# Patient Record
Sex: FEMALE | Race: WHITE | NOT HISPANIC OR LATINO | Employment: FULL TIME | URBAN - METROPOLITAN AREA
[De-identification: names, ages, dates, MRNs, and addresses within clinical notes are randomized per-mention and may not be internally consistent; named-entity substitution may affect disease eponyms.]

---

## 2023-09-05 ENCOUNTER — TELEPHONE (OUTPATIENT)
Age: 48
End: 2023-09-05

## 2023-09-05 NOTE — TELEPHONE ENCOUNTER
Patient called to schedule appt w/ Dr Alexys Hernandez. She previously had breast cancer and a masectomy. She wants to have breast reconstruction, she states there were prior complications w/ the left breast and now wants to take care of it. Please contact patient regarding appt.   Thank you    Pt has [employer] ins, will provide ID number when scheduling appt

## 2023-09-26 ENCOUNTER — OFFICE VISIT (OUTPATIENT)
Dept: PLASTIC SURGERY | Facility: CLINIC | Age: 48
End: 2023-09-26
Payer: COMMERCIAL

## 2023-09-26 VITALS
WEIGHT: 175 LBS | HEART RATE: 82 BPM | HEIGHT: 64 IN | SYSTOLIC BLOOD PRESSURE: 107 MMHG | BODY MASS INDEX: 29.88 KG/M2 | TEMPERATURE: 97.9 F | DIASTOLIC BLOOD PRESSURE: 82 MMHG

## 2023-09-26 DIAGNOSIS — Z90.13 S/P MASTECTOMY, BILATERAL: ICD-10-CM

## 2023-09-26 DIAGNOSIS — N65.1 BREAST ASYMMETRY FOLLOWING RECONSTRUCTIVE SURGERY: ICD-10-CM

## 2023-09-26 DIAGNOSIS — F31.9 BIPOLAR 1 DISORDER (HCC): ICD-10-CM

## 2023-09-26 DIAGNOSIS — Z85.3 HISTORY OF RIGHT BREAST CANCER: Primary | ICD-10-CM

## 2023-09-26 DIAGNOSIS — Z15.01 BRCA1 POSITIVE: ICD-10-CM

## 2023-09-26 DIAGNOSIS — I42.7 CARDIOMYOPATHY SECONDARY TO DRUG (HCC): ICD-10-CM

## 2023-09-26 DIAGNOSIS — Z15.09 BRCA1 POSITIVE: ICD-10-CM

## 2023-09-26 PROCEDURE — 99205 OFFICE O/P NEW HI 60 MIN: CPT | Performed by: PLASTIC SURGERY

## 2023-09-26 RX ORDER — CLONAZEPAM 0.5 MG/1
TABLET ORAL
COMMUNITY

## 2023-09-26 RX ORDER — CARVEDILOL 6.25 MG/1
TABLET ORAL
COMMUNITY
Start: 2023-08-13

## 2023-09-26 RX ORDER — CLOMIPRAMINE HYDROCHLORIDE 25 MG/1
150 CAPSULE ORAL
COMMUNITY

## 2023-09-26 RX ORDER — LEVOTHYROXINE SODIUM 20 UG/ML
INJECTION, SOLUTION INTRAVENOUS
COMMUNITY

## 2023-09-26 RX ORDER — CARIPRAZINE 1.5 MG-3MG
3 KIT ORAL
COMMUNITY

## 2023-09-26 RX ORDER — LAMOTRIGINE 100 MG/1
TABLET, EXTENDED RELEASE ORAL
COMMUNITY

## 2023-09-26 NOTE — PROGRESS NOTES
Plastic Surgery H&P  Rodney Jimenes      Assessment/Plan:    Assessment:  1. Hx of right breast cancer  2. BRCA 1 gene mutation   3. S/P B/L mastectomies  4. S/P implant reconstruction on right   5. Hx of failed tissue expander on left  6. Breast asymmetry  7. Bipolar disorder  8. Cardiomyopathy- chemo induced. Plan:   I had a lengthy discussion with the patient regarding her reconstructive options. We discussed autologous versus implant based reconstruction. For the left breast, I discussed the options of replacement of a left breast tissue expander with ADM versus latissimus dorsi myocutaneous flap with tissue expander placement versus GENE free flap reconstruction. I then discussed the potential risks, benefits and alternatives, including operative and recovery time of each procedure. Risks discussed included, but were not limited to: bleeding, infection, scarring, poor wound healing, demonstrating underlying structures, need for further surgery, need for multiple procedures, mastectomy flap necrosis, partial mastectomy flap necrosis, the off-label use of acellular dermal matrix, the risk of breast implant associated anaplastic large cell lymphoma, the need for the expansion process, the risk of seroma, the risk of DVT, risk of asymmetry, the need for multiple procedures, the need for revision, poor aesthetic result, asymmetry. In regard to GENE flap surgery, I discussed the complex nature of micro- surgery, benefits and alternatives of abdominally based autologous reconstruction including the above with the additional risk of micro surgical failure, flap failure, need for ICU stay, need for multiple procedures. The patient and her mother noted their understanding. All the patient's questions were answered to her satisfaction. At this time, she is unsure how she desires to proceed.   She will think about her options and let us know.  She is always welcome to call or return with any additional questions or concerns. Keyshawn Vitale is a 50 y.o. female who presents for evaluation for possible breast reconstruction. Patient has history of right breast cancer, diagnosed in June of 2022. Pathologic stage from 6/24/2022: Stage IIA (pT2, pN0(sn), cM0, G3, ER+, [place]-, HER2-). Patient was also found to be BRCA1 mutation positive. She underwent bilateral mastectomies. She initially had tissue expander reconstruction at [facility]. The left side tissue expander became infected and required removal.  Patient did complete the reconstruction on the right side with placement of the permanent implant. She is unhappy with the asymmetry and desires to proceed with left breast reconstruction. She presents today to discuss her reconstructive options. Patient has underlying bipolar disorder, which is well controlled. She did develop a cardiomyopathy from her initial dose of chemotherapy. She is on Coreg and stable. She denies any chest pain, shortness of breath, weakness, dizziness. She states she is in her usual state of health. No significant weight fluctuations. Past Medical History:   Diagnosis Date   • Bipolar 1 disorder ([address])    • Breast cancer ([address])     right   • Cardiomyopathy ([address])    • Hypertension    • Hypothyroid          Current Outpatient Medications:   •  Cariprazine HCl (Vraylar) 1.5 & 3 MG CPPK, Take 3 mg by mouth, Disp: , Rfl:   •  carvedilol (COREG) 6.25 mg tablet, , Disp: , Rfl:   •  clomiPRAMINE (ANAFRANIL) 25 mg capsule, Take 150 mg by mouth, Disp: , Rfl:   •  clonazePAM (KlonoPIN) 0.5 mg tablet, , Disp: , Rfl:   •  lamoTRIgine ER (LaMICtal XR) 100 MG TB24, , Disp: , Rfl:   •  Levothyroxine Sodium 100 MCG/5ML SOLN, Use, Disp: , Rfl:       Allergies   Allergen Reactions   • Amoxicillin-Pot Clavulanate Diarrhea       Family History   Family history unknown: Yes   Adopted.       Social History     Socioeconomic History   • Marital status: Single     Spouse name: Not on file   • Number of children: Not on file   • Years of education: Not on file   • Highest education level: Not on file   Occupational History   • Not on file   Tobacco Use   • Smoking status: Never   • Smokeless tobacco: Never   Substance and Sexual Activity   • Alcohol use: Never   • Drug use: Never   • Sexual activity: Not on file   Other Topics Concern   • Not on file   Social History Narrative   • Not on file     Social Determinants of Health     Financial Resource Strain: Not on file   Food Insecurity: Not on file   Transportation Needs: Not on file   Physical Activity: Not on file   Stress: Not on file   Social Connections: Not on file   Intimate Partner Violence: Not on file   Housing Stability: Not on file       Review of Systems  Pertinent items are noted in HPI. Objective     /82 (BP Location: Left arm, Patient Position: Sitting, Cuff Size: Standard)   Pulse 82   Temp 97.9 °F (36.6 °C) (Tympanic)   Ht 5' 4" (1.626 m)   Wt 79.4 kg (175 lb)   BMI 30.04 kg/m²     General:  alert and oriented, in no acute distress   Skin:  normal and no rash or abnormalities   Eyes: conjunctivae/corneas clear. PERRL, EOM's intact. Fundi benign. Mouth: MMM no lesions   Lymph Nodes:  Cervical, supraclavicular, and axillary nodes normal.   Lungs:  clear to auscultation bilaterally   Heart:  regular rate and rhythm, S1, S2 normal, no murmur, click, rub or gallop   Abdomen: soft, non-tender; bowel sounds normal; no masses,  no organomegaly   CVA:  absent   Genitourinary: defer exam   Extremities:  extremities normal, warm and well-perfused; no cyanosis, clubbing, or edema   Neurologic:  Alert and oriented x3. Gait normal. Reflexes and motor strength normal and symmetric. Cranial nerves 2-12 and sensation grossly intact. Psychiatric:  normal mood, behavior, speech, dress, and thought processes      Right breast: Implant in place, soft, mobile. Proportional shape and contour. No masses palpable, no axillary adenopathy palpable  Left breast: Acquired absence of left breast, well-healed transverse mastectomy scar with skin deficiency noted centrally and skin excess present laterally. No masses palpable, no axillary adenopathy palpable. A total of 60 mins was spent on the encounter, including reviewing the patient's records, documentation, and time spent in counseling coordination of care which represent greater than 50% of the visit. 35 mins was spent in counseling and discussion of surgical procedures.

## 2023-10-16 ENCOUNTER — TELEPHONE (OUTPATIENT)
Dept: PLASTIC SURGERY | Facility: CLINIC | Age: 48
End: 2023-10-16

## 2023-10-16 ENCOUNTER — TELEPHONE (OUTPATIENT)
Age: 48
End: 2023-10-16

## 2023-10-16 NOTE — TELEPHONE ENCOUNTER
Left message that Rebekah Riggins is talking with Dr. Naa Palmer tomorrow on how to proceed with an appointment for surgery. Either Rebekah Riggins or I will call patient back after getting information from Dr. Naa Palmer.

## 2023-10-16 NOTE — TELEPHONE ENCOUNTER
Patient called to schedule breast reconstruction before the next year , I have transferred  her to [facility] and patient left a detailed  vm .

## 2023-10-17 ENCOUNTER — TELEPHONE (OUTPATIENT)
Age: 48
End: 2023-10-17

## 2023-10-17 NOTE — TELEPHONE ENCOUNTER
Patient called ready to schedule breast recon surgery. I advised I would contact Tiburcio Nicole and let her know she is ready. I advised patient that Tiburcio Nicole would get back to her.

## 2023-10-23 ENCOUNTER — TELEPHONE (OUTPATIENT)
Dept: PLASTIC SURGERY | Facility: CLINIC | Age: 48
End: 2023-10-23

## 2023-10-23 NOTE — TELEPHONE ENCOUNTER
Patient will like a call back has been waiting since last week for a call I did advise that sometime it will take that long to receive a call back so she understood

## 2023-10-23 NOTE — TELEPHONE ENCOUNTER
Left message for patient that I forwarded her request to Dr. Dre Hernandez to please call to discuss surgery.

## 2023-10-24 ENCOUNTER — TELEPHONE (OUTPATIENT)
Dept: PLASTIC SURGERY | Facility: CLINIC | Age: 48
End: 2023-10-24

## 2023-10-24 NOTE — TELEPHONE ENCOUNTER
Called and left message for the patient regarding surgery scheduling. Instructed to call back tomorrow when I am in the office.

## 2023-10-25 NOTE — TELEPHONE ENCOUNTER
Called and spoke with the patient. She states she is interested in proceeding with left breast reconstruction with tissue expander/ADM. We will work on scheduling.

## 2023-10-25 NOTE — TELEPHONE ENCOUNTER
Rec'd call from patient stating that she has been waiting to receive call regarding obtaining surgery date. I stated that Dr. Stephen Vyas called her last night? She states that she did not receive that call. Please return call to patient to [phone]. Thank youj.

## 2023-10-26 ENCOUNTER — PREP FOR PROCEDURE (OUTPATIENT)
Dept: PLASTIC SURGERY | Facility: CLINIC | Age: 48
End: 2023-10-26

## 2023-10-26 ENCOUNTER — TELEPHONE (OUTPATIENT)
Dept: PLASTIC SURGERY | Facility: CLINIC | Age: 48
End: 2023-10-26

## 2023-10-26 DIAGNOSIS — Z85.3 PERSONAL HISTORY OF BREAST CANCER: Primary | ICD-10-CM

## 2023-10-26 DIAGNOSIS — Z90.13 H/O MASTECTOMY, BILATERAL: ICD-10-CM

## 2023-11-13 ENCOUNTER — OFFICE VISIT (OUTPATIENT)
Dept: PLASTIC SURGERY | Facility: CLINIC | Age: 48
End: 2023-11-13
Payer: COMMERCIAL

## 2023-11-13 VITALS
SYSTOLIC BLOOD PRESSURE: 120 MMHG | WEIGHT: 175 LBS | BODY MASS INDEX: 29.88 KG/M2 | TEMPERATURE: 98.2 F | DIASTOLIC BLOOD PRESSURE: 93 MMHG | HEIGHT: 64 IN | HEART RATE: 76 BPM

## 2023-11-13 DIAGNOSIS — Z15.01 BRCA1 POSITIVE: ICD-10-CM

## 2023-11-13 DIAGNOSIS — Z90.13 S/P MASTECTOMY, BILATERAL: ICD-10-CM

## 2023-11-13 DIAGNOSIS — F31.9 BIPOLAR 1 DISORDER (HCC): ICD-10-CM

## 2023-11-13 DIAGNOSIS — N65.1 BREAST ASYMMETRY FOLLOWING RECONSTRUCTIVE SURGERY: ICD-10-CM

## 2023-11-13 DIAGNOSIS — Z85.3 HISTORY OF RIGHT BREAST CANCER: ICD-10-CM

## 2023-11-13 DIAGNOSIS — Z15.09 BRCA1 POSITIVE: ICD-10-CM

## 2023-11-13 DIAGNOSIS — Z85.3 HISTORY OF RIGHT BREAST CANCER: Primary | ICD-10-CM

## 2023-11-13 DIAGNOSIS — I42.7 CARDIOMYOPATHY SECONDARY TO DRUG (HCC): ICD-10-CM

## 2023-11-13 PROCEDURE — 99215 OFFICE O/P EST HI 40 MIN: CPT | Performed by: PLASTIC SURGERY

## 2023-11-13 RX ORDER — OXYCODONE HYDROCHLORIDE 5 MG/1
5 TABLET ORAL EVERY 4 HOURS PRN
Qty: 15 TABLET | Refills: 0 | Status: SHIPPED | OUTPATIENT
Start: 2023-11-13

## 2023-11-13 RX ORDER — CYCLOBENZAPRINE HCL 10 MG
10 TABLET ORAL 3 TIMES DAILY
Qty: 30 TABLET | Refills: 0 | Status: SHIPPED | OUTPATIENT
Start: 2023-11-13 | End: 2023-11-23

## 2023-11-13 RX ORDER — GABAPENTIN 300 MG/1
300 CAPSULE ORAL 3 TIMES DAILY PRN
Qty: 30 CAPSULE | Refills: 0 | Status: SHIPPED | OUTPATIENT
Start: 2023-11-13

## 2023-11-13 RX ORDER — SULFAMETHOXAZOLE AND TRIMETHOPRIM 800; 160 MG/1; MG/1
1 TABLET ORAL EVERY 8 HOURS SCHEDULED
Qty: 42 TABLET | Refills: 0 | Status: SHIPPED | OUTPATIENT
Start: 2023-11-13 | End: 2023-11-14

## 2023-11-13 RX ORDER — ONDANSETRON 4 MG/1
4 TABLET, FILM COATED ORAL EVERY 8 HOURS PRN
Qty: 20 TABLET | Refills: 0 | Status: SHIPPED | OUTPATIENT
Start: 2023-11-13

## 2023-11-13 RX ORDER — ACETAMINOPHEN 325 MG/1
975 TABLET ORAL EVERY 6 HOURS
Qty: 120 TABLET | Refills: 0 | Status: SHIPPED | OUTPATIENT
Start: 2023-11-13 | End: 2023-11-23

## 2023-11-14 RX ORDER — CEPHALEXIN 500 MG/1
500 CAPSULE ORAL EVERY 8 HOURS SCHEDULED
Qty: 42 CAPSULE | Refills: 0 | Status: SHIPPED | OUTPATIENT
Start: 2023-11-14 | End: 2023-11-28

## 2023-11-14 NOTE — PROGRESS NOTES
[facility] Surgery H&P  Rodney Jimenes        Assessment/Plan:     Assessment:  1. Hx of right breast cancer  2. BRCA 1 gene mutation   3. S/P B/L mastectomies  4. S/P implant reconstruction on right   5. Hx of failed tissue expander on left  6. Breast asymmetry  7. Bipolar disorder  8. Cardiomyopathy- chemo induced. Plan:  The patient has elected for delayed left breast reconstruction with tissue expander and ACell dermal matrix placement. I discussed the potential risks, benefits and alternatives, including operative and recovery time of the procedure. Risks discussed included, but were not limited to: bleeding, infection, scarring, hematoma, poor wound healing, damage to underlying structures, need for further surgery, need for multiple procedures, skin flap necrosis, the off-label use of acellular dermal matrix, the need for the expansion process, the risk of seroma, the risk of DVT, risk of asymmetry,  the need for revision, poor aesthetic result, need for removal of the tissue expander. The patient noted her understanding. All the patient's questions were answered to her satisfaction. At this time, she desires to proceed. Informed consent obtained. Pre and postop instructions discussed. Use of surgical drains and mohsen dressings discussed. She remains an appropriate candidate for surgery. Surgical risk factors include: Obesity, history of previous failed tissue expander. She is always welcome to call or return with any additional questions or concerns. Keyshawn Emery is a 50 y.o. female who presents for pre-op evaluation for  breast reconstruction. Patient has history of right breast cancer, diagnosed in June of 2022. Pathologic stage from 6/24/2022: Stage IIA (pT2, pN0(sn), cM0, G3, ER+, [place]-, HER2-). Patient was also found to be BRCA1 mutation positive. She underwent bilateral mastectomies.   She initially had tissue expander reconstruction at [facility]. The left side tissue expander became infected and required removal.  Patient did complete the reconstruction on the right side with placement of the permanent implant. She is unhappy with the asymmetry and desires to proceed with left breast reconstruction. She presents today to discuss her reconstructive options. Patient has underlying bipolar disorder, which is well controlled. She did develop a cardiomyopathy from her initial dose of chemotherapy. She is on Coreg and stable. She denies any chest pain, shortness of breath, weakness, dizziness. She states she is in her usual state of health. No significant weight fluctuations. Medical History        Past Medical History:   Diagnosis Date   • Bipolar 1 disorder ([address])     • Breast cancer ([address])       right   • Cardiomyopathy ([address])     • Hypertension     • Hypothyroid                 Current Outpatient Medications:   •  Cariprazine HCl (Vraylar) 1.5 & 3 MG CPPK, Take 3 mg by mouth, Disp: , Rfl:   •  carvedilol (COREG) 6.25 mg tablet, , Disp: , Rfl:   •  clomiPRAMINE (ANAFRANIL) 25 mg capsule, Take 150 mg by mouth, Disp: , Rfl:   •  clonazePAM (KlonoPIN) 0.5 mg tablet, , Disp: , Rfl:   •  lamoTRIgine ER (LaMICtal XR) 100 MG TB24, , Disp: , Rfl:   •  Levothyroxine Sodium 100 MCG/5ML SOLN, Use, Disp: , Rfl:              Allergies   Allergen Reactions   • Amoxicillin-Pot Clavulanate Diarrhea         Family History   Family History   Family history unknown: Yes      Adopted.         Social History               Socioeconomic History   • Marital status: Single       Spouse name: Not on file   • Number of children: Not on file   • Years of education: Not on file   • Highest education level: Not on file   Occupational History   • Not on file   Tobacco Use   • Smoking status: Never   • Smokeless tobacco: Never   Substance and Sexual Activity   • Alcohol use: Never   • Drug use: Never   • Sexual activity: Not on file   Other Topics Concern   • Not on file   Social History Narrative   • Not on file      Social Determinants of Health      Financial Resource Strain: Not on file   Food Insecurity: Not on file   Transportation Needs: Not on file   Physical Activity: Not on file   Stress: Not on file   Social Connections: Not on file   Intimate Partner Violence: Not on file   Housing Stability: Not on file            Review of Systems  Pertinent items are noted in HPI. Objective     /93   Pulse 76   Temp 98.2 °F (36.8 °C) (Tympanic)   Ht 5' 4" (1.626 m)   Wt 79.4 kg (175 lb)   BMI 30.04 kg/m²        General:  alert and oriented, in no acute distress   Skin:  normal and no rash or abnormalities   Eyes: conjunctivae/corneas clear. PERRL, EOM's intact. Fundi benign. Mouth: MMM no lesions   Lymph Nodes:  Cervical, supraclavicular, and axillary nodes normal.   Lungs:  clear to auscultation bilaterally   Heart:  regular rate and rhythm, S1, S2 normal, no murmur, click, rub or gallop   Abdomen: soft, non-tender; bowel sounds normal; no masses,  no organomegaly   CVA:  absent   Genitourinary: defer exam   Extremities:  extremities normal, warm and well-perfused; no cyanosis, clubbing, or edema   Neurologic:  Alert and oriented x3. Gait normal. Reflexes and motor strength normal and symmetric. Cranial nerves 2-12 and sensation grossly intact. Psychiatric:  normal mood, behavior, speech, dress, and thought processes   Right breast: Implant in place, soft, mobile. Proportional shape and contour. No masses palpable, no axillary adenopathy palpable  Left breast: Acquired absence of left breast, well-healed transverse mastectomy scar with skin deficiency noted centrally and skin excess present laterally. No masses palpable, no axillary adenopathy palpable.         A total of 45 mins was spent on the encounter, including reviewing the patient's records, documentation, and time spent in counseling coordination of care which represent greater than 50% of the visit. 30 mins was spent in counseling and discussion of surgical procedures.

## 2023-11-16 ENCOUNTER — TELEPHONE (OUTPATIENT)
Age: 48
End: 2023-11-16

## 2023-11-16 NOTE — TELEPHONE ENCOUNTER
Patient called to see if we received a fax from [facility] for her FMLA/Disability. Checked with the office, Cora Barr advised that they did not see it in the office but Starr Dixon might have it but is not in the office at this time. I advised I would send her a message and would have her give the patient a call to let her know if she had it or not.

## 2023-11-21 ENCOUNTER — TELEPHONE (OUTPATIENT)
Dept: PLASTIC SURGERY | Facility: CLINIC | Age: 48
End: 2023-11-21

## 2023-11-21 ENCOUNTER — TELEPHONE (OUTPATIENT)
Age: 48
End: 2023-11-21

## 2023-11-21 DIAGNOSIS — Z85.3 HISTORY OF RIGHT BREAST CANCER: Primary | ICD-10-CM

## 2023-11-21 DIAGNOSIS — Z15.01 BRCA1 POSITIVE: ICD-10-CM

## 2023-11-21 DIAGNOSIS — Z90.13 S/P MASTECTOMY, BILATERAL: ICD-10-CM

## 2023-11-21 DIAGNOSIS — Z15.09 BRCA1 POSITIVE: ICD-10-CM

## 2023-11-21 NOTE — TELEPHONE ENCOUNTER
Patient called to speak with [employer] in regards to information her requested from her.  I transferred the call to [email or web address]

## 2023-12-15 RX ORDER — NALTREXONE HYDROCHLORIDE 50 MG/1
50 TABLET, FILM COATED ORAL DAILY PRN
COMMUNITY

## 2023-12-15 RX ORDER — MELOXICAM 7.5 MG/1
7.5 TABLET ORAL DAILY
COMMUNITY
End: 2023-12-27

## 2023-12-15 RX ORDER — SPIRONOLACTONE 25 MG/1
12.5 TABLET ORAL DAILY
COMMUNITY

## 2023-12-15 RX ORDER — TAMOXIFEN CITRATE 20 MG/1
20 TABLET ORAL DAILY
COMMUNITY
Start: 2023-06-20 | End: 2023-12-27

## 2023-12-15 RX ORDER — OLMESARTAN MEDOXOMIL 20 MG/1
40 TABLET ORAL
COMMUNITY

## 2023-12-15 RX ORDER — DIPHENOXYLATE HYDROCHLORIDE AND ATROPINE SULFATE 2.5; .025 MG/1; MG/1
1 TABLET ORAL DAILY
COMMUNITY
End: 2023-12-27

## 2023-12-15 RX ORDER — LEVOTHYROXINE SODIUM 0.1 MG/1
100 TABLET ORAL DAILY
COMMUNITY

## 2023-12-15 NOTE — PRE-PROCEDURE INSTRUCTIONS
Pre-Surgery Instructions:   Medication Instructions    Cariprazine HCl (Vraylar) 1.5 & 3 MG CPPK Take day of surgery.    carvedilol (COREG) 6.25 mg tablet Take day of surgery.    clomiPRAMINE (ANAFRANIL) 25 mg capsule Take night before surgery    clonazePAM (KlonoPIN) 0.5 mg tablet Uses PRN- OK to take day of surgery    lamoTRIgine ER (LaMICtal XR) 100 MG TB24 Take day of surgery.    levothyroxine 100 mcg tablet Take day of surgery.    meloxicam (MOBIC) 7.5 mg tablet Stop taking 3 days prior to surgery.    multivitamin (THERAGRAN) TABS Stop taking 7 days prior to surgery.    naltrexone (REVIA) 50 mg tablet Uses PRN- OK to take day of surgery    olaparib (Lynparza) tablet Take day of surgery.    olmesartan (BENICAR) 20 mg tablet Take night before surgery    Omega-3 Fatty Acids (FISH OIL PO) Stop taking 7 days prior to surgery.    spironolactone (ALDACTONE) 25 mg tablet Hold day of surgery.    tamoxifen (NOLVADEX) 20 mg tablet Take day of surgery.   Medication instructions for day surgery reviewed. Please use only a sip of water to take your instructed medications. Avoid all over the counter vitamins, supplements and NSAIDS for one week prior to surgery per anesthesia guidelines. Tylenol is ok to take as needed.     You will receive a call one business day prior to surgery with an arrival time and hospital directions. If your surgery is scheduled on a Monday, the hospital will be calling you on the Friday prior to your surgery. If you have not heard from anyone by 8pm, please call the hospital supervisor through the hospital  at [phone]. (Arnold [phone]).    Do not eat or drink anything after midnight the night before your surgery, including candy, mints, lifesavers, or chewing gum. Do not drink alcohol 24hrs before your surgery. Try not to smoke at least 24hrs before your surgery.       Follow the pre surgery showering instructions as listed in the “My Surgical Experience Booklet” or otherwise  provided by your surgeon's office. Do not use a blade to shave the surgical area 1 week before surgery. It is okay to use a clean electric clippers up to 24 hours before surgery. Do not apply any lotions, creams, including makeup, cologne, deodorant, or perfumes after showering on the day of your surgery. Do not use dry shampoo, hair spray, hair gel, or any type of hair products.     No contact lenses, eye make-up, or artificial eyelashes. Remove nail polish, including gel polish, and any artificial, gel, or acrylic nails if possible. Remove all jewelry including rings and body piercing jewelry.     Wear causal clothing that is easy to take on and off. Consider your type of surgery.    Keep any valuables, jewelry, piercings at home. Please bring any specially ordered equipment (sling, braces) if indicated.    Arrange for a responsible person to drive you to and from the hospital on the day of your surgery. Visitor Guidelines discussed.     Call the surgeon's office with any new illnesses, exposures, or additional questions prior to surgery.    Please reference your “My Surgical Experience Booklet” for additional information to prepare for your upcoming surgery.    Aware of 3 Ensure drinks pre op with instructions reviewed.

## 2023-12-21 ENCOUNTER — TELEPHONE (OUTPATIENT)
Age: 48
End: 2023-12-21

## 2023-12-21 NOTE — TELEPHONE ENCOUNTER
Patient called regarding  severe knee pain , she has been taking MOBIC and can not stopped it .   Has a surgery with Dr. Givens on 12/27

## 2023-12-21 NOTE — TELEPHONE ENCOUNTER
Patient calling with medication questions, she has sx with Dr. Givens on 12/27, please advise and call pt back at earliest convenience. Thank you!

## 2023-12-25 ENCOUNTER — ANESTHESIA EVENT (OUTPATIENT)
Dept: PERIOP | Facility: HOSPITAL | Age: 48
End: 2023-12-25
Payer: COMMERCIAL

## 2023-12-26 ENCOUNTER — TELEPHONE (OUTPATIENT)
Dept: PLASTIC SURGERY | Facility: CLINIC | Age: 48
End: 2023-12-26

## 2023-12-26 NOTE — TELEPHONE ENCOUNTER
Patient returned my call. I informed her that she can continue with her Mobic until tomorrow. She is to not take any day of surgery. Patient verbalized understanding and appreciated my call.

## 2023-12-27 ENCOUNTER — ANESTHESIA (OUTPATIENT)
Dept: PERIOP | Facility: HOSPITAL | Age: 48
End: 2023-12-27
Payer: COMMERCIAL

## 2023-12-27 ENCOUNTER — HOSPITAL ENCOUNTER (OUTPATIENT)
Facility: HOSPITAL | Age: 48
Setting detail: OUTPATIENT SURGERY
Discharge: HOME | End: 2023-12-27
Attending: PLASTIC SURGERY | Admitting: PLASTIC SURGERY
Payer: COMMERCIAL

## 2023-12-27 VITALS
RESPIRATION RATE: 18 BRPM | BODY MASS INDEX: 30.04 KG/M2 | OXYGEN SATURATION: 94 % | SYSTOLIC BLOOD PRESSURE: 123 MMHG | TEMPERATURE: 98.3 F | DIASTOLIC BLOOD PRESSURE: 63 MMHG | HEIGHT: 64 IN | HEART RATE: 110 BPM | WEIGHT: 175.93 LBS

## 2023-12-27 DIAGNOSIS — Z85.3 PERSONAL HISTORY OF BREAST CANCER: ICD-10-CM

## 2023-12-27 DIAGNOSIS — Z98.890 S/P BREAST RECONSTRUCTION, LEFT: Primary | ICD-10-CM

## 2023-12-27 DIAGNOSIS — Z90.13 H/O MASTECTOMY, BILATERAL: ICD-10-CM

## 2023-12-27 PROBLEM — F41.9 ANXIETY: Status: ACTIVE | Noted: 2023-12-27

## 2023-12-27 PROBLEM — E03.9 HYPOTHYROID: Status: ACTIVE | Noted: 2023-12-27

## 2023-12-27 PROBLEM — F32.A DEPRESSION: Status: ACTIVE | Noted: 2023-12-27

## 2023-12-27 PROBLEM — C50.919 BREAST CANCER (HCC): Status: ACTIVE | Noted: 2023-12-27

## 2023-12-27 PROBLEM — F31.9 BIPOLAR 1 DISORDER (HCC): Status: ACTIVE | Noted: 2023-12-27

## 2023-12-27 PROBLEM — I10 HYPERTENSION: Status: ACTIVE | Noted: 2023-12-27

## 2023-12-27 PROCEDURE — 19357 TISS XPNDR PLMT BRST RCNSTJ: CPT

## 2023-12-27 PROCEDURE — 15777 ACELLULAR DERM MATRIX IMPLT: CPT

## 2023-12-27 PROCEDURE — 88305 TISSUE EXAM BY PATHOLOGIST: CPT | Performed by: PATHOLOGY

## 2023-12-27 PROCEDURE — 14301 TIS TRNFR ANY 30.1-60 SQ CM: CPT | Performed by: PLASTIC SURGERY

## 2023-12-27 PROCEDURE — 14302 TIS TRNFR ADDL 30 SQ CM: CPT | Performed by: PLASTIC SURGERY

## 2023-12-27 PROCEDURE — C1781 MESH (IMPLANTABLE): HCPCS | Performed by: PLASTIC SURGERY

## 2023-12-27 PROCEDURE — 15777 ACELLULAR DERM MATRIX IMPLT: CPT | Performed by: PLASTIC SURGERY

## 2023-12-27 PROCEDURE — 99024 POSTOP FOLLOW-UP VISIT: CPT | Performed by: PLASTIC SURGERY

## 2023-12-27 PROCEDURE — 14301 TIS TRNFR ANY 30.1-60 SQ CM: CPT

## 2023-12-27 PROCEDURE — 14302 TIS TRNFR ADDL 30 SQ CM: CPT

## 2023-12-27 PROCEDURE — C9290 INJ, BUPIVACAINE LIPOSOME: HCPCS

## 2023-12-27 PROCEDURE — C1789 PROSTHESIS, BREAST, IMP: HCPCS | Performed by: PLASTIC SURGERY

## 2023-12-27 PROCEDURE — 19357 TISS XPNDR PLMT BRST RCNSTJ: CPT | Performed by: PLASTIC SURGERY

## 2023-12-27 DEVICE — SILTEX LOW HEIGHT TISSUE EXPANDER STYLE 9100, 350CC
Type: IMPLANTABLE DEVICE | Site: BREAST | Status: FUNCTIONAL
Brand: MENTOR CPX 4 BREAST TISSUE EXPANDER WITH SUTURE TABS

## 2023-12-27 DEVICE — IMPLANTABLE DEVICE: Type: IMPLANTABLE DEVICE | Status: FUNCTIONAL

## 2023-12-27 RX ORDER — CEPHALEXIN 500 MG/1
500 CAPSULE ORAL EVERY 6 HOURS SCHEDULED
Qty: 40 CAPSULE | Refills: 0 | Status: SHIPPED | OUTPATIENT
Start: 2023-12-27 | End: 2024-01-06

## 2023-12-27 RX ORDER — MAGNESIUM HYDROXIDE 1200 MG/15ML
LIQUID ORAL AS NEEDED
Status: DISCONTINUED | OUTPATIENT
Start: 2023-12-27 | End: 2023-12-27 | Stop reason: HOSPADM

## 2023-12-27 RX ORDER — FENTANYL CITRATE 50 UG/ML
INJECTION, SOLUTION INTRAMUSCULAR; INTRAVENOUS AS NEEDED
Status: DISCONTINUED | OUTPATIENT
Start: 2023-12-27 | End: 2023-12-27

## 2023-12-27 RX ORDER — LIDOCAINE HYDROCHLORIDE 10 MG/ML
0.5 INJECTION, SOLUTION EPIDURAL; INFILTRATION; INTRACAUDAL; PERINEURAL ONCE AS NEEDED
Status: DISCONTINUED | OUTPATIENT
Start: 2023-12-27 | End: 2023-12-27 | Stop reason: HOSPADM

## 2023-12-27 RX ORDER — DEXAMETHASONE SODIUM PHOSPHATE 10 MG/ML
INJECTION, SOLUTION INTRAMUSCULAR; INTRAVENOUS AS NEEDED
Status: DISCONTINUED | OUTPATIENT
Start: 2023-12-27 | End: 2023-12-27

## 2023-12-27 RX ORDER — PROPOFOL 10 MG/ML
INJECTION, EMULSION INTRAVENOUS AS NEEDED
Status: DISCONTINUED | OUTPATIENT
Start: 2023-12-27 | End: 2023-12-27

## 2023-12-27 RX ORDER — MEPERIDINE HYDROCHLORIDE 25 MG/ML
12.5 INJECTION INTRAMUSCULAR; INTRAVENOUS; SUBCUTANEOUS
Status: DISCONTINUED | OUTPATIENT
Start: 2023-12-27 | End: 2023-12-27 | Stop reason: HOSPADM

## 2023-12-27 RX ORDER — ONDANSETRON 2 MG/ML
4 INJECTION INTRAMUSCULAR; INTRAVENOUS ONCE AS NEEDED
Status: DISCONTINUED | OUTPATIENT
Start: 2023-12-27 | End: 2023-12-27 | Stop reason: HOSPADM

## 2023-12-27 RX ORDER — ACETAMINOPHEN 325 MG/1
650 TABLET ORAL ONCE AS NEEDED
Status: DISCONTINUED | OUTPATIENT
Start: 2023-12-27 | End: 2023-12-27 | Stop reason: HOSPADM

## 2023-12-27 RX ORDER — HYDROMORPHONE HCL/PF 1 MG/ML
0.5 SYRINGE (ML) INJECTION
Status: DISCONTINUED | OUTPATIENT
Start: 2023-12-27 | End: 2023-12-27 | Stop reason: HOSPADM

## 2023-12-27 RX ORDER — LIDOCAINE HYDROCHLORIDE 20 MG/ML
INJECTION, SOLUTION EPIDURAL; INFILTRATION; INTRACAUDAL; PERINEURAL AS NEEDED
Status: DISCONTINUED | OUTPATIENT
Start: 2023-12-27 | End: 2023-12-27

## 2023-12-27 RX ORDER — ACETAMINOPHEN 325 MG/1
975 TABLET ORAL ONCE
Status: COMPLETED | OUTPATIENT
Start: 2023-12-27 | End: 2023-12-27

## 2023-12-27 RX ORDER — MIDAZOLAM HYDROCHLORIDE 2 MG/2ML
INJECTION, SOLUTION INTRAMUSCULAR; INTRAVENOUS AS NEEDED
Status: DISCONTINUED | OUTPATIENT
Start: 2023-12-27 | End: 2023-12-27

## 2023-12-27 RX ORDER — SODIUM CHLORIDE, SODIUM LACTATE, POTASSIUM CHLORIDE, CALCIUM CHLORIDE 600; 310; 30; 20 MG/100ML; MG/100ML; MG/100ML; MG/100ML
125 INJECTION, SOLUTION INTRAVENOUS CONTINUOUS
Status: DISCONTINUED | OUTPATIENT
Start: 2023-12-27 | End: 2023-12-27 | Stop reason: HOSPADM

## 2023-12-27 RX ORDER — ROCURONIUM BROMIDE 10 MG/ML
INJECTION, SOLUTION INTRAVENOUS AS NEEDED
Status: DISCONTINUED | OUTPATIENT
Start: 2023-12-27 | End: 2023-12-27

## 2023-12-27 RX ORDER — PROPOFOL 10 MG/ML
INJECTION, EMULSION INTRAVENOUS CONTINUOUS PRN
Status: DISCONTINUED | OUTPATIENT
Start: 2023-12-27 | End: 2023-12-27

## 2023-12-27 RX ORDER — TRANEXAMIC ACID 100 MG/ML
INJECTION, SOLUTION INTRAVENOUS AS NEEDED
Status: DISCONTINUED | OUTPATIENT
Start: 2023-12-27 | End: 2023-12-27

## 2023-12-27 RX ORDER — ONDANSETRON 2 MG/ML
INJECTION INTRAMUSCULAR; INTRAVENOUS AS NEEDED
Status: DISCONTINUED | OUTPATIENT
Start: 2023-12-27 | End: 2023-12-27

## 2023-12-27 RX ORDER — CEFAZOLIN SODIUM 1 G/50ML
1000 SOLUTION INTRAVENOUS ONCE
Status: COMPLETED | OUTPATIENT
Start: 2023-12-27 | End: 2023-12-27

## 2023-12-27 RX ORDER — HYDROMORPHONE HCL/PF 1 MG/ML
SYRINGE (ML) INJECTION AS NEEDED
Status: DISCONTINUED | OUTPATIENT
Start: 2023-12-27 | End: 2023-12-27

## 2023-12-27 RX ORDER — ENOXAPARIN SODIUM 100 MG/ML
40 INJECTION SUBCUTANEOUS DAILY
Qty: 2 ML | Refills: 0 | Status: SHIPPED | OUTPATIENT
Start: 2023-12-27 | End: 2024-01-01

## 2023-12-27 RX ORDER — OXYCODONE HYDROCHLORIDE 5 MG/1
5 TABLET ORAL ONCE AS NEEDED
Status: COMPLETED | OUTPATIENT
Start: 2023-12-27 | End: 2023-12-27

## 2023-12-27 RX ORDER — GABAPENTIN 100 MG/1
100 CAPSULE ORAL ONCE
Status: COMPLETED | OUTPATIENT
Start: 2023-12-27 | End: 2023-12-27

## 2023-12-27 RX ORDER — FENTANYL CITRATE/PF 50 MCG/ML
25 SYRINGE (ML) INJECTION
Status: DISCONTINUED | OUTPATIENT
Start: 2023-12-27 | End: 2023-12-27 | Stop reason: HOSPADM

## 2023-12-27 RX ADMIN — MIDAZOLAM 2 MG: 1 INJECTION INTRAMUSCULAR; INTRAVENOUS at 10:40

## 2023-12-27 RX ADMIN — PROPOFOL 100 MCG/KG/MIN: 10 INJECTION, EMULSION INTRAVENOUS at 10:51

## 2023-12-27 RX ADMIN — FENTANYL CITRATE 100 MCG: 50 INJECTION INTRAMUSCULAR; INTRAVENOUS at 10:45

## 2023-12-27 RX ADMIN — GABAPENTIN 100 MG: 100 CAPSULE ORAL at 09:42

## 2023-12-27 RX ADMIN — ACETAMINOPHEN 325MG 975 MG: 325 TABLET ORAL at 09:42

## 2023-12-27 RX ADMIN — PROPOFOL 20 MG: 10 INJECTION, EMULSION INTRAVENOUS at 12:09

## 2023-12-27 RX ADMIN — TRANEXAMIC ACID 1 G: 1 INJECTION, SOLUTION INTRAVENOUS at 12:49

## 2023-12-27 RX ADMIN — PROPOFOL 200 MG: 10 INJECTION, EMULSION INTRAVENOUS at 10:45

## 2023-12-27 RX ADMIN — ROCURONIUM BROMIDE 10 MG: 10 INJECTION, SOLUTION INTRAVENOUS at 12:46

## 2023-12-27 RX ADMIN — DEXAMETHASONE SODIUM PHOSPHATE 10 MG: 10 INJECTION INTRAMUSCULAR; INTRAVENOUS at 11:00

## 2023-12-27 RX ADMIN — FENTANYL CITRATE 50 MCG: 50 INJECTION INTRAMUSCULAR; INTRAVENOUS at 12:34

## 2023-12-27 RX ADMIN — OXYCODONE HYDROCHLORIDE 5 MG: 5 TABLET ORAL at 16:02

## 2023-12-27 RX ADMIN — SUGAMMADEX 200 MG: 100 INJECTION, SOLUTION INTRAVENOUS at 13:11

## 2023-12-27 RX ADMIN — ONDANSETRON 4 MG: 2 INJECTION INTRAMUSCULAR; INTRAVENOUS at 13:03

## 2023-12-27 RX ADMIN — ROCURONIUM BROMIDE 20 MG: 10 INJECTION, SOLUTION INTRAVENOUS at 12:10

## 2023-12-27 RX ADMIN — SODIUM CHLORIDE, SODIUM LACTATE, POTASSIUM CHLORIDE, AND CALCIUM CHLORIDE: .6; .31; .03; .02 INJECTION, SOLUTION INTRAVENOUS at 13:05

## 2023-12-27 RX ADMIN — SODIUM CHLORIDE, SODIUM LACTATE, POTASSIUM CHLORIDE, AND CALCIUM CHLORIDE: .6; .31; .03; .02 INJECTION, SOLUTION INTRAVENOUS at 10:56

## 2023-12-27 RX ADMIN — CEFAZOLIN SODIUM 1000 MG: 1 SOLUTION INTRAVENOUS at 10:39

## 2023-12-27 RX ADMIN — FENTANYL CITRATE 50 MCG: 50 INJECTION INTRAMUSCULAR; INTRAVENOUS at 11:27

## 2023-12-27 RX ADMIN — HYDROMORPHONE HYDROCHLORIDE 0.5 MG: 1 INJECTION, SOLUTION INTRAMUSCULAR; INTRAVENOUS; SUBCUTANEOUS at 12:10

## 2023-12-27 RX ADMIN — SODIUM CHLORIDE, SODIUM LACTATE, POTASSIUM CHLORIDE, AND CALCIUM CHLORIDE 125 ML/HR: .6; .31; .03; .02 INJECTION, SOLUTION INTRAVENOUS at 09:41

## 2023-12-27 RX ADMIN — ROCURONIUM BROMIDE 10 MG: 10 INJECTION, SOLUTION INTRAVENOUS at 11:19

## 2023-12-27 RX ADMIN — ROCURONIUM BROMIDE 40 MG: 10 INJECTION, SOLUTION INTRAVENOUS at 10:45

## 2023-12-27 RX ADMIN — LIDOCAINE HYDROCHLORIDE 100 MG: 20 INJECTION, SOLUTION EPIDURAL; INFILTRATION; INTRACAUDAL at 10:45

## 2023-12-27 NOTE — H&P
Plastic Surgery H&P  [facility] Plastic  And Reconstructive Surgery   [address] [place] [address]        Assessment/Plan:     Assessment:  1. Hx of right breast cancer  2. BRCA 1 gene mutation   3. S/P B/L mastectomies  4. S/P implant reconstruction on right   5. Hx of failed tissue expander on left  6. Breast asymmetry  7. Bipolar disorder  8. Cardiomyopathy- chemo induced.        Plan:  The patient has elected for delayed left breast reconstruction with tissue expander and ACell dermal matrix placement.  I discussed the potential risks, benefits and alternatives, including operative and recovery time of the procedure.  Risks discussed included, but were not limited to: bleeding, infection, scarring, hematoma, poor wound healing, damage to underlying structures, need for further surgery, need for multiple procedures, skin flap necrosis, the off-label use of acellular dermal matrix, the need for the expansion process, the risk of seroma, the risk of DVT, risk of asymmetry,  the need for revision, poor aesthetic result, need for removal of the tissue expander. The patient noted her understanding.  All the patient's questions were answered to her satisfaction.  At this time, she desires to proceed.  Informed consent obtained.  Pre and postop instructions discussed.  Use of surgical drains and mohsen dressings discussed.  She remains an appropriate candidate for surgery.  Surgical risk factors include: Obesity, history of previous failed tissue expander.  She is always welcome to call or return with any additional questions or concerns.        Keyshawn Arthur is a 47 y.o. female who presents for pre-op evaluation for  breast reconstruction.  Patient has history of right breast cancer, diagnosed in June of 2022. Pathologic stage from 6/24/2022: Stage IIA (pT2, pN0(sn), cM0, G3, ER+, AR-, HER2-).  Patient was also found to be BRCA1 mutation positive.  She underwent bilateral mastectomies.  She  initially had tissue expander reconstruction at [facility].  The left side tissue expander became infected and required removal.  Patient did complete the reconstruction on the right side with placement of the permanent implant.  She is unhappy with the asymmetry and desires to proceed with left breast reconstruction.  She presents today to discuss her reconstructive options.  Patient has underlying bipolar disorder, which is well controlled.  She did develop a cardiomyopathy from her initial dose of chemotherapy.  She is on Coreg and stable.  She denies any chest pain, shortness of breath, weakness, dizziness.  She states she is in her usual state of health.  No significant weight fluctuations.        Medical History           Past Medical History:   Diagnosis Date    Bipolar 1 disorder (HCC)      Breast cancer (HCC)       right    Cardiomyopathy (HCC)      Hypertension      Hypothyroid                 Current Outpatient Medications:     Cariprazine HCl (Vraylar) 1.5 & 3 MG CPPK, Take 3 mg by mouth, Disp: , Rfl:     carvedilol (COREG) 6.25 mg tablet, , Disp: , Rfl:     clomiPRAMINE (ANAFRANIL) 25 mg capsule, Take 150 mg by mouth, Disp: , Rfl:     clonazePAM (KlonoPIN) 0.5 mg tablet, , Disp: , Rfl:     lamoTRIgine ER (LaMICtal XR) 100 MG TB24, , Disp: , Rfl:     Levothyroxine Sodium 100 MCG/5ML SOLN, Use, Disp: , Rfl:                 Allergies   Allergen Reactions    Amoxicillin-Pot Clavulanate Diarrhea         Family History   Family History   Family history unknown: Yes      Adopted.        Social History                   Socioeconomic History    Marital status: Single       Spouse name: Not on file    Number of children: Not on file    Years of education: Not on file    Highest education level: Not on file   Occupational History    Not on file   Tobacco Use    Smoking status: Never    Smokeless tobacco: Never   Substance and Sexual Activity    Alcohol use: Never    Drug use: Never    Sexual activity: Not on file  "  Other Topics Concern    Not on file   Social History Narrative    Not on file      Social Determinants of Health      Financial Resource Strain: Not on file   Food Insecurity: Not on file   Transportation Needs: Not on file   Physical Activity: Not on file   Stress: Not on file   Social Connections: Not on file   Intimate Partner Violence: Not on file   Housing Stability: Not on file            Review of Systems  Pertinent items are noted in HPI.      Objective     /80   Pulse 98   Temp 98.1 °F (36.7 °C) (Temporal)   Resp 16   Ht 5' 4\" (1.626 m)   Wt 79.8 kg (175 lb 14.8 oz)   LMP 05/01/2022 (Approximate)   SpO2 97%   BMI 30.20 kg/m²           General:  alert and oriented, in no acute distress   Skin:  normal and no rash or abnormalities   Eyes: conjunctivae/corneas clear. PERRL, EOM's intact. Fundi benign.   Mouth: MMM no lesions   Lymph Nodes:  Cervical, supraclavicular, and axillary nodes normal.   Lungs:  clear to auscultation bilaterally   Heart:  regular rate and rhythm, S1, S2 normal, no murmur, click, rub or gallop   Abdomen: soft, non-tender; bowel sounds normal; no masses,  no organomegaly   CVA:  absent   Genitourinary: defer exam   Extremities:  extremities normal, warm and well-perfused; no cyanosis, clubbing, or edema   Neurologic:  Alert and oriented x3. Gait normal. Reflexes and motor strength normal and symmetric. Cranial nerves 2-12 and sensation grossly intact.   Psychiatric:  normal mood, behavior, speech, dress, and thought processes   Right breast: Implant in place, soft, mobile.  Proportional shape and contour.  No masses palpable, no axillary adenopathy palpable  Left breast: Acquired absence of left breast, well-healed transverse mastectomy scar with skin deficiency noted centrally and skin excess present laterally.  No masses palpable, no axillary adenopathy palpable.  "

## 2023-12-27 NOTE — ANESTHESIA PREPROCEDURE EVALUATION
Procedure:  LEFT BREAST RECON AND PLACEMENT OF TISSUE EXPANDER/ADM (Left: Breast)    Relevant Problems   CARDIO   (+) Hypertension      ENDO   (+) Hypothyroid      GYN   (+) Breast cancer (HCC)      NEURO/PSYCH   (+) Anxiety   (+) Depression        Physical Exam    Airway  Comment: Micrognathia noted  Mallampati score: IV  TM Distance: <3 FB       Dental   No notable dental hx     Cardiovascular  Rhythm: regular, Rate: normal    Pulmonary   Breath sounds clear to auscultation    Other Findings        Anesthesia Plan  ASA Score- 2     Anesthesia Type- general with ASA Monitors.         Additional Monitors:     Airway Plan: ETT and LMA.    Comment: Pt had cardiomyopathy listed in PMH in EMR, per patient OSH cardiology eval with repeat echo done at [facility] did NOT show cardiomyopathy, EF nl per patient report. .       Plan Factors-Exercise tolerance (METS): >4 METS.    Chart reviewed.   Existing labs reviewed.     Patient is not a current smoker.      Obstructive sleep apnea risk education given perioperatively.        Induction- intravenous.    Postoperative Plan- Plan for postoperative opioid use.     Informed Consent- Anesthetic plan and risks discussed with patient.

## 2023-12-27 NOTE — OP NOTE
"OPERATIVE REPORT  PATIENT NAME: Fay Arthur    :  1975  MRN: [id number]  Pt Location: [facility] OR ROOM 05    SURGERY DATE: 2023    Surgeons and Role:     * Fay Givens MD - Primary     * Esmer Lema PA-C - Assisting    Preop Diagnosis:  Personal history of breast cancer [Z85.3]  H/O mastectomy, bilateral [Z90.13]    Post-Op Diagnosis Codes:     * Personal history of breast cancer [Z85.3]     * H/O mastectomy, bilateral [Z90.13]    Procedure(s):  Left - LEFT BREAST RECON AND PLACEMENT OF TISSUE EXPANDER/ADM    Specimen(s):  ID Type Source Tests Collected by Time Destination   1 : Left Breast Scar, Skin and Capsul - History of Breast Cancer Tissue Breast, Left TISSUE EXAM Fay Givens MD 2023 1121        Estimated Blood Loss:   Minimal    Drains:  Closed/Suction Drain Inferior;Left;Lateral Chest Bulb 15 Fr. (Active)   Number of days: 0       Closed/Suction Drain Lateral;Left;Inferior Chest Bulb 15 Fr. (Active)   Number of days: 0       Anesthesia Type:   General    Operative Indications:  Personal history of breast cancer [Z85.3]  H/O mastectomy, bilateral [Z90.13]      Operative Findings:  [place] CPX4 tissue expander, 350cc, ref:[id number], SN:[id number]. Not filled.    Complications:   None    Procedure and Technique:  \"Tobin\" advancement flap for recreation of left breast pocket and repair of IMF, 150cm2  Placement of pre-pectoral tissue expander with ADM, 400cm2.  Left pectoralis I/II block with Exparel.  Application of BAILEY NPWT dressing, 200cm2, Non-DME.      The patient was identified in preop holding area and preoperative markings were made.  Patient was then transferred operating room placed supine on operative table.  Venodyne boots were placed and activated.  All pressure points were appropriately and padded.  After induction of general endotracheal anesthesia, the entire anterior chest wall was then sterilely prepped and draped with ChloraPrep in usual fashion.  First " "attention focused on the left chest wall.  The previous scar was marked out in elliptical fashion the scar was then incised with a 15 blade scalpel taken down through skin and subcutaneous tissue.  The scar was then sharply excised and sent to pathology the specimen labeled right breast scar.  Dissection then proceeded using electrocautery through the deep subcu tissue and then the pectoralis muscle was identified.  A local \"Tobin\" advancement flap was then developed over the pectoralis muscle to the level of the inferior clavicle superiorly.  Inferiorly, a flap was developed and elevated to the level of the inframammary fold.  Extensive scar tissue was encountered and an open capsulectomy of the scarred capsule was performed with electro-caughtery. Also, extensive cross hatching of the scarred capsule was also performed.  Hemostasis obtained at all times using electrocautery.  Once the pocket was then developed, the pocket was then irrigated with antibiotic irrigation.  Then, an FlexHD acellular tissue matrix, perforated, contour large, 400 centimeter squared,  was then opened on the field and soaked per 's recommendations.  On the back table, a [place] CPX4 low height tissue expander, 350cc, ref: [id number], [id number] was opened and placed into a large basin with antibiotic irrigation. The air was withdrawn from the tissue expander. The FlexHD was then wrapped circumferentially around the tissue expander with 2-0 Vicryl running sutures. Complete coverage of the tissue expander was obtained. The tissue expander/ADM was then left in the covered basin. Attention focused back on the left chest wall. A pectoralis 1 and 2 block was then performed with Exparel 20 cc.  The Exparel was then injected along the lateral border of pectorals major muscle, pectoralis minor, anterior serratus.  Next 2- #15. [place] Doug drains were placed.   They were brought out laterally through stab incisions and sutured in place " with 2-0 silk suture ligatures.  Once again, the left breast pocket was copiously irrigated with double antibiotic solution containing Ancef and gentamicin.  A Betadine soak was then performed.  The pocket was then copiously irrigated normal saline.  Next the entire left anterior chest wall was then sterilely re-prepped with ChloraPrep and redraped with blue towels.  I then changed my gloves to handle the tissue expander.  Then,  the tissue expander/ADM was then placed into the left breast pre-pectoral pocket. The tissue expander was then sewn down to the chest wall with 2-0 PDS sutures through the three suture tabs. Additionally, the  FlexHD was then sewn down to the chest wall at 12, 1, 4, 8, 11 o'clock with 2-0 PDS sutures.  Once again the pocket was irrigated with double antibiotic solution.  The inferior and superior local advancement flaps were then advanced and the wound was closed in multiple layers.  Total area of local advancement flap closure measured 10 x 15cm for 150 centimeter squared. First, the superficial fascia and deep subcutaneous tissue was closed with 2-0 Vicryl in a buried interrupted fashion.  The skin was then closed in layered fashion first using 3-0 Monocryl Stratafix in running deep dermal fashion followed by 3-0 Monocryl Stratafix in a running subcuticular closure.  The incision site was cleansed and dried.  It was wiped with alcohol. A BAILEY NPWT dressing, 20 x 10cm,  was applied to aid in wound healing.  The drains were dressed with chlorhexidine bio patches, 2 x 2 gauze and Tegaderm.  ABD pads and a surgical bra were placed.  The PALLAVI drains were placed on bulb suction.  The patient tolerated the procedure well.  There were no complications. I was present for the entire procedure. No qualified resident was available for the case.    A physician assistant was required during the procedure for retraction, tissue handling, dissection and suturing.    Patient Disposition:  PACU          SIGNATURE: Fay Givens MD  DATE: December 27, 2023  TIME: 1:16 PM

## 2023-12-27 NOTE — DISCHARGE INSTR - AVS FIRST PAGE
Post-Op Discharge Instructions  Dr. Fay Givens  [facility] Plastic and Reconstructive Surgery  [address] [place] [address], [facility] [address], [place] [address]  [phone]     Your dressings should stay in place until seen in the office. Please do not get dressings wet or submerge them in water until seen in the office  Keep BAILEY dressings on until seen by Dr. Givens  If the light is blinking green, the battery pack is functioning properly  If the light blinks orange, hit the orange button to re-establish the seal, this will usually correct the problem, if the light continues to blink orange, the dressing will still continue to function  Call the office if the dressing becomes completely saturated    Keep surgical bra on at all times, except to shower.  It is ok to remove bra while laying down at rest for short intervals for comfort  After 2 weeks, it is ok to switch to a sports bra.  It is recommended to use one that bautista in the front.  No underwire bra for 6 weeks.  Use ABD pads or a form of padding for extra compression.    3. No strenuous activity or lifting over 10 lbs for a minimum of 4 weeks. No repetitive pushing, pulling or overhead, repetitive arm motions.    4. Please record fluid from both of your drains daily and bring this to your first post-op appointment. After emptying the drain, be sure to squeeze the bulb as you reseal the cap.    5. Your first post-op appointment is scheduled for 1/2/2024 @ 10:30AM at the [facility].    6. Take following medications with a checkmark ([x]) as prescribed, and do not take any pain medication on an empty stomach.  [x]Tylenol 975mg, every 6 hours for pain control.  [x]Gabapentin 100mg, every 8 hours for nerve pain.  [x]Flexeril 10mg, every 8 hours for muscle spasms/pain.  [x]Oxycodone, 5mg, 1 tablet every 4 hours, as needed for severe/break-through  pain.  [x]Keflex, 500mg, 1 tablet every 8 hours. (Antibiotic)  [x]Lovenox, 40mg injection, once a day, for 5  days    Please hold all multivitamins or minerals until 5 days post-op.  Please hold Tamoxifen until 5 days post-op  Please hold any NSAIDs, Motrin, Advil or Ibuprofen until 5 days post-op.  You may experience dizziness, lightheadedness, nausea or sleepiness when taking Gabapentin and Flexeril in conjunction with your home prescribed medications.     7. Resume any prior medications at home unless otherwise instructed by Dr. Givens    8. You must be off all pain medications and have a clear field of vision before driving.    9. For the next 24 hours:  a. Do not sign any legal documents or operate machinery.  b. Have a responsible adult help you.  c. Take it easy & rest.    10. Call Dr. Givens at the office [phone] if any:   a. Obvious bleeding, excessive swelling, or warmth at the site  b. Fever over 101.0°  c. Shortness of breath, severe calf or thigh pain.  d. Redness, odor, or pus at the wound. (Some oozing is normal from incision sites and may continue for several days)  e. Persistent vomiting or pain that is not relieved by your medication.

## 2023-12-27 NOTE — ANESTHESIA POSTPROCEDURE EVALUATION
Post-Op Assessment Note    CV Status:  Stable    Pain management: adequate       Mental Status:  Awake   Hydration Status:  Stable   PONV Controlled:  Controlled   Airway Patency:  Patent     Post Op Vitals Reviewed: Yes      Staff: Anesthesiologist               /68 (12/27/23 1506)    Temp (!) 97.3 °F (36.3 °C) (12/27/23 1506)    Pulse 102 (12/27/23 1506)   Resp 20 (12/27/23 1506)    SpO2 95 % (12/27/23 1506)

## 2023-12-28 ENCOUNTER — TELEPHONE (OUTPATIENT)
Age: 48
End: 2023-12-28

## 2023-12-28 NOTE — TELEPHONE ENCOUNTER
RN called pt and pharmacy. Pharmacy just needed clarification on medication and will be filling med.

## 2023-12-28 NOTE — TELEPHONE ENCOUNTER
Patient reports she is experiencing a measurable amount of pain on her left side, she is requesting a script for Oxycodone be sent to her preferred pharmacy. [facility] [place], [address].

## 2024-01-02 ENCOUNTER — OFFICE VISIT (OUTPATIENT)
Dept: PLASTIC SURGERY | Facility: CLINIC | Age: 49
End: 2024-01-02

## 2024-01-02 DIAGNOSIS — Z98.890 S/P BREAST RECONSTRUCTION, LEFT: Primary | ICD-10-CM

## 2024-01-02 PROCEDURE — 88305 TISSUE EXAM BY PATHOLOGIST: CPT | Performed by: PATHOLOGY

## 2024-01-02 PROCEDURE — 99024 POSTOP FOLLOW-UP VISIT: CPT | Performed by: PLASTIC SURGERY

## 2024-01-12 ENCOUNTER — OFFICE VISIT (OUTPATIENT)
Dept: PLASTIC SURGERY | Facility: CLINIC | Age: 49
End: 2024-01-12

## 2024-01-12 DIAGNOSIS — Z90.13 S/P MASTECTOMY, BILATERAL: ICD-10-CM

## 2024-01-12 DIAGNOSIS — Z98.890 S/P BREAST RECONSTRUCTION, LEFT: Primary | ICD-10-CM

## 2024-01-12 PROCEDURE — 99024 POSTOP FOLLOW-UP VISIT: CPT

## 2024-01-12 NOTE — PROGRESS NOTES
[facility] Plastic and Reconstructive Surgery  [address], [address], [place] [address]  [phone]    Patient Identification: Fay Arthur is a 48 y.o. female     History of Present Illness: The patient is a 48 y.o.  year-old female  who presents to the office for post-op visit. Patient is 16 days s/p Left Breast Recon And Placement Of Tissue Expander/adm - Left  on 12/27/2023 by Dr. Givens.  [place] CPX 350cc tissue expander in left breast. No saline injected intraoperatively.    Patient states she is doing well at this time. Denies significant pain, fevers, chills or signs of infection. Left drain output is 15cc daily. Wearing compression bra at all times. Limiting arm activities. Patient has no complaints at this time.    Past Medical History:   Diagnosis Date    Anxiety 12/27/2023    Bipolar 1 disorder (HCC) 12/27/2023    Breast cancer (HCC) 12/27/2023    right    Cardiomyopathy (HCC)     Depression 12/27/2023    Disease of thyroid gland     Hypertension 12/27/2023    Hypothyroid 12/27/2023      Review of Systems  Constitutional: Denies fevers, chills or pain.  Skin: Denies any warmth, erythema, edema or mucopurulent drainage.     Physical Exam    Breast: Surgical incision is clean, dry, and intact. Skin perfusion is intact. Expected amount of post-operative edema. There are no signs of infection, obvious hematoma, seroma or wound dehiscence. Drain patent with sanguineous fluid in bulb.    Assessment and Plan:  The patient is an 48 y.o.  year-old female who presents to the office for post-op visit. Patient is 16 days s/p Left Breast Recon And Placement Of Tissue Expander/adm - Left  on 12/27/2023 by Dr. Givens    -At today's visit left breast drain removed. Area was covered with bacitracin and a band-aid. Patient may change as needed.  -Continue wearing surgical compression bra at all times. Patient is to refrain from exercise/repetitive arm movements for 4-6 weeks post-op.  -The patient is to  return in 1 week for first TE fill. All TE fill appts were scheduled in office today with Fay.  -The patient is to call the office with any questions or concerns. All of the patient's questions were answered at this time and they agree with the plan of care.      Esmer Lema PA-C  [facility] Plastic and Reconstructive Surgery

## 2024-01-18 ENCOUNTER — OFFICE VISIT (OUTPATIENT)
Dept: PLASTIC SURGERY | Facility: CLINIC | Age: 49
End: 2024-01-18

## 2024-01-18 DIAGNOSIS — Z98.890 S/P BREAST RECONSTRUCTION, LEFT: Primary | ICD-10-CM

## 2024-01-18 PROCEDURE — 99024 POSTOP FOLLOW-UP VISIT: CPT

## 2024-01-19 NOTE — PROGRESS NOTES
[facility] Plastic and Reconstructive Surgery  [address], [address], [place] [address]  [phone]    Patient Identification: Fay Arthur is a 48 y.o. female     History of Present Illness: The patient is a 48 y.o.  year-old female  who presents to the office for post-op visit. Patient is 22 days s/p Left Breast Recon And Placement Of Tissue Expander/adm - Left  on 12/27/2023 by Dr. Givens.  [place] CPX 350cc tissue expander in left breast. No saline injected intraoperatively.    Patient states she is doing well at this time. Denies significant pain, fevers, chills or signs of infection. Wearing compression bra at all times. Limiting arm activities. Patient has no complaints at this time.    Past Medical History:   Diagnosis Date    Anxiety 12/27/2023    Bipolar 1 disorder (HCC) 12/27/2023    Breast cancer (HCC) 12/27/2023    right    Cardiomyopathy (HCC)     Depression 12/27/2023    Disease of thyroid gland     Hypertension 12/27/2023    Hypothyroid 12/27/2023      Review of Systems  Constitutional: Denies fevers, chills or pain.  Skin: Denies any warmth, erythema, edema or mucopurulent drainage.     Physical Exam    Left Breast: Surgical incision well-healed. Skin perfusion is intact. There are no signs of infection, obvious hematoma, seroma or wound dehiscence.    Procedure: Bilateral Tissue Expander Fill  Under sterile conditions the bilateral tissue expanders were percutaneously accessed without difficulty.      50 ml of NS was injected into the left.  Patient tolerated procedure well.     A ''time out'' was initiated prior to procedure. Non-OR time Out:  Time out was initiated under direction and supervision of provider Esmer Lema PA-C  Correct patient identity - Yes  Correct side and site - Yes  Procedure matches verbalized consent -Yes  Correct patient position - Yes  Availability of correct implants and any special equipment or special requirements - Yes  Time out occurred prior to  procedure start - Yes     Total TE volume:   Left: 50/350 ml     Assessment and Plan:  The patient is an 48 y.o.  year-old female who presents to the office for post-op visit. Patient is 22 days s/p Left Breast Recon And Placement Of Tissue Expander/adm - Left  on 12/27/2023 by Dr. Givens    -At today's visit left breast was examined. Incision sites is well-healed.   -TE filled with 50cc saline. Pt tolerated well.  -Continue wearing surgical compression bra. Refrain from arm exercises and repetitive movements until 4-6 weeks postop.  -The patient is to return in 1 week for TE fill.  -The patient is to call the office with any questions or concerns. All of the patient's questions were answered at this time and they agree with the plan of care.      Esmer Lema PA-C  [facility] Plastic and Reconstructive Surgery

## 2024-01-24 ENCOUNTER — OFFICE VISIT (OUTPATIENT)
Dept: PLASTIC SURGERY | Facility: CLINIC | Age: 49
End: 2024-01-24

## 2024-01-24 DIAGNOSIS — Z98.890 S/P BREAST RECONSTRUCTION, LEFT: Primary | ICD-10-CM

## 2024-01-24 PROCEDURE — 99024 POSTOP FOLLOW-UP VISIT: CPT

## 2024-01-24 NOTE — PROGRESS NOTES
[facility] Plastic and Reconstructive Surgery  [address], [address], [place] [address]  [phone]    Patient Identification: Fay Arthur is a 48 y.o. female     History of Present Illness: The patient is a 48 y.o.  year-old female  who presents to the office for post-op visit. Patient is 28 days s/p Left Breast Recon And Placement Of Tissue Expander/adm - Left  on 12/27/2023 by Dr. Givens.  [place] CPX 350cc tissue expander in left breast. No saline injected intraoperatively.    Patient states she is doing well at this time. Tolerated last fill well. Denies significant pain, fevers, chills or signs of infection. Wearing compression bra at all times. Limiting arm activities. Patient has no complaints at this time.    Past Medical History:   Diagnosis Date    Anxiety 12/27/2023    Bipolar 1 disorder (HCC) 12/27/2023    Breast cancer (HCC) 12/27/2023    right    Cardiomyopathy (HCC)     Depression 12/27/2023    Disease of thyroid gland     Hypertension 12/27/2023    Hypothyroid 12/27/2023      Review of Systems  Constitutional: Denies fevers, chills or pain.  Skin: Denies any warmth, erythema, edema or mucopurulent drainage.     Physical Exam    Left Breast: Surgical incision well-healed. Skin perfusion is intact. There are no signs of infection, obvious hematoma, seroma or wound dehiscence.    Procedure: Bilateral Tissue Expander Fill  Under sterile conditions the bilateral tissue expanders were percutaneously accessed without difficulty.      100 ml of NS was injected into the left.  Patient tolerated procedure well.     A ''time out'' was initiated prior to procedure. Non-OR time Out:  Time out was initiated under direction and supervision of provider Esmer Lema PA-C  Correct patient identity - Yes  Correct side and site - Yes  Procedure matches verbalized consent -Yes  Correct patient position - Yes  Availability of correct implants and any special equipment or special requirements -  Yes  Time out occurred prior to procedure start - Yes     Total TE volume:   Left: 150/350 ml     Assessment and Plan:  The patient is an 48 y.o.  year-old female who presents to the office for post-op visit. Patient is 28 days s/p Left Breast Recon And Placement Of Tissue Expander/adm - Left  on 12/27/2023 by Dr. Givens    -At today's visit left breast was examined. Incision sites is well-healed.   -TE filled with 100cc saline. Pt tolerated well.  -Continue wearing surgical compression bra. Refrain from arm exercises and repetitive movements until 4-6 weeks postop.  -Per patient's request, may return in 2 weeks for TE fill.  -The patient is to call the office with any questions or concerns. All of the patient's questions were answered at this time and they agree with the plan of care.      Esmer Lema PA-C  [facility] Plastic and Reconstructive Surgery

## 2024-02-06 ENCOUNTER — TELEPHONE (OUTPATIENT)
Dept: PLASTIC SURGERY | Facility: CLINIC | Age: 49
End: 2024-02-06

## 2024-02-06 ENCOUNTER — OFFICE VISIT (OUTPATIENT)
Dept: PLASTIC SURGERY | Facility: CLINIC | Age: 49
End: 2024-02-06

## 2024-02-06 DIAGNOSIS — Z15.09 BRCA1 POSITIVE: ICD-10-CM

## 2024-02-06 DIAGNOSIS — Z15.01 BRCA1 POSITIVE: ICD-10-CM

## 2024-02-06 DIAGNOSIS — Z90.13 S/P MASTECTOMY, BILATERAL: ICD-10-CM

## 2024-02-06 DIAGNOSIS — Z85.3 HISTORY OF RIGHT BREAST CANCER: ICD-10-CM

## 2024-02-06 DIAGNOSIS — Z98.890 S/P BREAST RECONSTRUCTION, LEFT: Primary | ICD-10-CM

## 2024-02-06 PROCEDURE — 99024 POSTOP FOLLOW-UP VISIT: CPT | Performed by: PLASTIC SURGERY

## 2024-02-06 NOTE — PROGRESS NOTES
Subjective:       Fay Arthur presents to the clinic s/p Left Breast Recon And Placement Of Tissue Expander/adm - Left  on 12/27/2023. [place] CPX 350cc tissue expander in left breast. No saline injected intraoperatively.     Patient states she is doing well at this time. Tolerated last fill well. Denies significant pain, fevers, chills or signs of infection. Wearing compression bra at all times. Limiting arm activities. Patient has no complaints at this time.    Objective:      LMP 05/01/2022 (Approximate)     General:  alert and oriented, in no acute distress          Left Breast: Surgical incision well-healed. Skin perfusion is intact. There are no signs of infection, hematoma, seroma or wound dehiscence. Expanding well.      Procedure: Bilateral Tissue Expander Fill  Under sterile conditions the left tissue expander was percutaneously accessed without difficulty.        100 ml of NS was injected into the left.  Patient tolerated procedure well.     A ''time out'' was initiated prior to procedure. Non-OR time Out:  Time out was initiated under direction and supervision of provider Fay Givens MD  Correct patient identity - Yes  Correct side and site - Yes  Procedure matches verbalized consent -Yes  Correct patient position - Yes  Availability of correct implants and any special equipment or special requirements - Yes  Time out occurred prior to procedure start - Yes     Total TE volume:   Left: 250/350 ml     Assessment:      Doing well postoperatively.      Plan:      1. Continue compression bra  2.  Refrain from arm exercises and repetitive movements until 4-6 weeks postop. Patient may begin to increase activities slowly.  3. Follow up: 1 week for continued tissue expansion

## 2024-02-12 NOTE — TELEPHONE ENCOUNTER
Patient wants to cancel her appointment for tomorrow 2/13 due to the weather she was wondering if she could come in the 19th as her apt and then just schedule another one after that?

## 2024-02-13 NOTE — TELEPHONE ENCOUNTER
Patient calling stating she needs her return to work note faxed to [phone] by the end of the day, I let her know no one was in the office today due to the snow as the office is closed. She is going to call her HR dept and call us back if any problems.

## 2024-02-14 NOTE — TELEPHONE ENCOUNTER
Patient called stating she needs her return to work form faxed to her HR department by 11am this morning there fax number is [phone] attention HR.

## 2024-02-15 ENCOUNTER — TELEPHONE (OUTPATIENT)
Age: 49
End: 2024-02-15

## 2024-02-15 NOTE — TELEPHONE ENCOUNTER
Patient called wanting to knowif she gets charged for her post op visits and the injections she gets at those visits as they are Post Op. I advised that I was unable to answer that question but that I would send a message to the office and someone from the office will call her back with an answer.

## 2024-02-19 ENCOUNTER — OFFICE VISIT (OUTPATIENT)
Dept: PLASTIC SURGERY | Facility: CLINIC | Age: 49
End: 2024-02-19

## 2024-02-19 DIAGNOSIS — Z90.13 S/P MASTECTOMY, BILATERAL: ICD-10-CM

## 2024-02-19 DIAGNOSIS — Z98.890 S/P BREAST RECONSTRUCTION, LEFT: Primary | ICD-10-CM

## 2024-02-19 DIAGNOSIS — Z85.3 HISTORY OF RIGHT BREAST CANCER: ICD-10-CM

## 2024-02-19 PROCEDURE — 99024 POSTOP FOLLOW-UP VISIT: CPT

## 2024-02-19 NOTE — PROGRESS NOTES
[facility] Plastic and Reconstructive Surgery  [address], [address], [place] [address]  [phone]    Patient Identification: Fay Arthur is a 48 y.o. female     History of Present Illness: The patient is a 48 y.o.  year-old female  who presents to the office for post-op visit. Patient is 54 days s/p Left Breast Recon And Placement Of Tissue Expander/adm - Left  on 12/27/2023 by Dr. Givens.  [place] CPX 350cc tissue expander in left breast. No saline injected intraoperatively.    Patient states she is doing well at this time. Tolerated last fill well. Denies significant pain, fevers, chills or signs of infection. Wearing compression bra. Patient has no complaints at this time.    Past Medical History:   Diagnosis Date    Anxiety 12/27/2023    Bipolar 1 disorder (HCC) 12/27/2023    Breast cancer (HCC) 12/27/2023    right    Cardiomyopathy (HCC)     Depression 12/27/2023    Disease of thyroid gland     Hypertension 12/27/2023    Hypothyroid 12/27/2023      Review of Systems  Constitutional: Denies fevers, chills or pain.  Skin: Denies any warmth, erythema, edema or mucopurulent drainage.     Physical Exam    Left Breast: Surgical incision well-healed. Skin perfusion is intact. There are no signs of infection, obvious hematoma, seroma or wound dehiscence.    Procedure: Bilateral Tissue Expander Fill  Under sterile conditions the bilateral tissue expanders were percutaneously accessed without difficulty.      100 ml of NS was injected into the left.  Patient tolerated procedure well.     A ''time out'' was initiated prior to procedure. Non-OR time Out:  Time out was initiated under direction and supervision of provider Esmer Lema PA-C  Correct patient identity - Yes  Correct side and site - Yes  Procedure matches verbalized consent -Yes  Correct patient position - Yes  Availability of correct implants and any special equipment or special requirements - Yes  Time out occurred prior to procedure  start - Yes     Total TE volume:   Left: 350/350 ml     Assessment and Plan:  The patient is an 48 y.o.  year-old female who presents to the office for post-op visit. Patient is 54 days s/p Left Breast Recon And Placement Of Tissue Expander/adm - Left  on 12/27/2023 by Dr. Givens    -At today's visit left breast was examined. Incision sites is well-healed.   -TE filled with 100cc saline. Pt tolerated well. She is filled to 350/350cc  -Patient to follow up in coming weeks for pre-op appt with Dr. Givens. Call with any concerns.  -The patient is to call the office with any questions or concerns. All of the patient's questions were answered at this time and they agree with the plan of care.      Esmer Lmea PA-C  [facility] Plastic and Reconstructive Surgery

## 2024-03-19 ENCOUNTER — OFFICE VISIT (OUTPATIENT)
Dept: PLASTIC SURGERY | Facility: CLINIC | Age: 49
End: 2024-03-19

## 2024-03-19 DIAGNOSIS — Z90.13 S/P MASTECTOMY, BILATERAL: ICD-10-CM

## 2024-03-19 DIAGNOSIS — Z98.890 S/P BREAST RECONSTRUCTION, LEFT: Primary | ICD-10-CM

## 2024-03-19 DIAGNOSIS — Z15.01 BRCA1 POSITIVE: ICD-10-CM

## 2024-03-19 DIAGNOSIS — Z15.09 BRCA1 POSITIVE: ICD-10-CM

## 2024-03-19 PROCEDURE — 99024 POSTOP FOLLOW-UP VISIT: CPT | Performed by: PLASTIC SURGERY

## 2024-03-19 NOTE — PROGRESS NOTES
[facility] Plastic and Reconstructive Surgery  [address], [address], [place] [address]  [phone]     Patient Identification: Fay Arthur is a 48 y.o. female      History of Present Illness: The patient is a 48 y.o.  year-old female  who presents to the office for post-op visit. Patient is 3 months s/p Left Breast Recon And Placement Of Tissue Expander/adm - Left  on 12/27/2023 .  [place] CPX 350cc tissue expander in left breast. No saline injected intraoperatively.     Patient states she is doing well at this time. Tolerated last fill well. Denies significant pain, fevers, chills or signs of infection. Wearing compression bra. Patient has no complaints at this time.     Medical History        Past Medical History:   Diagnosis Date    Anxiety 12/27/2023    Bipolar 1 disorder (HCC) 12/27/2023    Breast cancer (HCC) 12/27/2023     right    Cardiomyopathy (HCC)      Depression 12/27/2023    Disease of thyroid gland      Hypertension 12/27/2023    Hypothyroid 12/27/2023         Review of Systems  Constitutional: Denies fevers, chills or pain.  Skin: Denies any warmth, erythema, edema or mucopurulent drainage.      Physical Exam     Left Breast: Surgical incision well-healed. Skin perfusion is intact. There are no signs of infection, obvious hematoma, seroma or wound dehiscence.     Procedure: Bilateral Tissue Expander Fill  Under sterile conditions the bilateral tissue expanders were percutaneously accessed without difficulty.        75 ml of NS was injected into the left.  Patient tolerated procedure well.     A ''time out'' was initiated prior to procedure. Non-OR time Out:  Time out was initiated under direction and supervision of provider Esmer Lema PA-C  Correct patient identity - Yes  Correct side and site - Yes  Procedure matches verbalized consent -Yes  Correct patient position - Yes  Availability of correct implants and any special equipment or special requirements - Yes  Time out  occurred prior to procedure start - Yes     Total TE volume:   Left: 425/350 ml      Assessment and Plan:  The patient is an 48 y.o.  year-old female who presents to the office for post-op visit. Patient is 54 days s/p Left Breast Recon And Placement Of Tissue Expander/adm - Left  on 12/27/2023.     -At today's visit left breast was examined. Incision site is well-healed. Good contour and symmetry with right side.  -TE filled with 75cc saline. Pt tolerated well. She is filled to 425/350cc  -We will work on scheduling for TE to implant, possible fat grafting.   -Patient is interested in learning options for financial assistance. Referral placed to social work.   -The patient is to call the office with any questions or concerns. All of the patient's questions were answered at this time and they agree with the plan of care.

## 2024-03-20 ENCOUNTER — PATIENT OUTREACH (OUTPATIENT)
Dept: CASE MANAGEMENT | Facility: HOSPITAL | Age: 49
End: 2024-03-20

## 2024-03-20 NOTE — PROGRESS NOTES
LSW reviewed pt's chart and accepted the referral. LSW will contact patient to complete the initial OSW assessment, DT and offer any available resources and support patient may need.

## 2024-03-21 ENCOUNTER — PATIENT OUTREACH (OUTPATIENT)
Dept: CASE MANAGEMENT | Facility: HOSPITAL | Age: 49
End: 2024-03-21

## 2024-03-21 NOTE — PROGRESS NOTES
VLADIMIRW spoke with patient this morning to follow up on the referral from Dr Givens requesting patient has concerns with finances and medical expenses. Patient is asking if she can receive any help paying for deductible and copays that she cannot afford. VLADIMIRW offered to email pt an application for FA. Pt does not have any way to print it out.  CARSON explained that I will have the Hospital Financial counselors mail her an application for FA. Otherwise, patient had no further concerns. VLADIMIRW closed this case, but provided my contact information in case pt has any questions.

## 2024-03-22 ENCOUNTER — TELEPHONE (OUTPATIENT)
Dept: PLASTIC SURGERY | Facility: CLINIC | Age: 49
End: 2024-03-22

## 2024-03-22 ENCOUNTER — PREP FOR PROCEDURE (OUTPATIENT)
Dept: PLASTIC SURGERY | Facility: CLINIC | Age: 49
End: 2024-03-22

## 2024-03-22 DIAGNOSIS — Z15.01 BRCA1 POSITIVE: ICD-10-CM

## 2024-03-22 DIAGNOSIS — Z15.09 BRCA1 POSITIVE: ICD-10-CM

## 2024-03-22 DIAGNOSIS — Z85.3 PERSONAL HISTORY OF BREAST CANCER: ICD-10-CM

## 2024-03-22 DIAGNOSIS — Z98.890 S/P BREAST RECONSTRUCTION, LEFT: Primary | ICD-10-CM

## 2024-04-23 ENCOUNTER — OFFICE VISIT (OUTPATIENT)
Dept: PLASTIC SURGERY | Facility: CLINIC | Age: 49
End: 2024-04-23
Payer: COMMERCIAL

## 2024-04-23 VITALS
TEMPERATURE: 97.8 F | WEIGHT: 178 LBS | HEART RATE: 74 BPM | HEIGHT: 64 IN | BODY MASS INDEX: 30.39 KG/M2 | SYSTOLIC BLOOD PRESSURE: 123 MMHG | DIASTOLIC BLOOD PRESSURE: 91 MMHG

## 2024-04-23 DIAGNOSIS — Z15.09 BRCA1 POSITIVE: ICD-10-CM

## 2024-04-23 DIAGNOSIS — Z98.890 S/P BREAST RECONSTRUCTION, LEFT: Primary | ICD-10-CM

## 2024-04-23 DIAGNOSIS — N65.1 BREAST ASYMMETRY FOLLOWING RECONSTRUCTIVE SURGERY: ICD-10-CM

## 2024-04-23 DIAGNOSIS — Z90.13 S/P MASTECTOMY, BILATERAL: ICD-10-CM

## 2024-04-23 DIAGNOSIS — Z85.3 HISTORY OF RIGHT BREAST CANCER: ICD-10-CM

## 2024-04-23 DIAGNOSIS — F31.9 BIPOLAR 1 DISORDER (HCC): ICD-10-CM

## 2024-04-23 DIAGNOSIS — Z15.01 BRCA1 POSITIVE: ICD-10-CM

## 2024-04-23 DIAGNOSIS — I42.7 CARDIOMYOPATHY SECONDARY TO DRUG (HCC): ICD-10-CM

## 2024-04-23 PROCEDURE — 99215 OFFICE O/P EST HI 40 MIN: CPT | Performed by: PLASTIC SURGERY

## 2024-04-23 RX ORDER — TAMOXIFEN CITRATE 20 MG/1
20 TABLET ORAL DAILY
COMMUNITY
Start: 2024-04-22

## 2024-04-23 RX ORDER — CEPHALEXIN 500 MG/1
500 CAPSULE ORAL EVERY 8 HOURS SCHEDULED
Qty: 21 CAPSULE | Refills: 0 | Status: SHIPPED | OUTPATIENT
Start: 2024-04-23 | End: 2024-04-30

## 2024-04-23 RX ORDER — CYCLOBENZAPRINE HCL 10 MG
10 TABLET ORAL 3 TIMES DAILY PRN
Qty: 15 TABLET | Refills: 0 | Status: SHIPPED | OUTPATIENT
Start: 2024-04-23

## 2024-04-23 RX ORDER — MELOXICAM 15 MG/1
TABLET ORAL
COMMUNITY
Start: 2023-12-14

## 2024-04-23 NOTE — PROGRESS NOTES
[facility]  [facility] Plastic  And Reconstructive Surgery   [address] [place] [address]        Assessment/Plan:     Assessment:  1. Hx of right breast cancer  2. BRCA 1 gene mutation   3. S/P B/L mastectomies  4. S/P implant reconstruction on right   5. Hx of failed tissue expander on left  6. Breast asymmetry  7. Bipolar disorder  8. Cardiomyopathy- chemo induced.        Plan:  The patient is now ready for second stage breast recon with removal of left TE, placement of permanent silicone gel implant, possible fat grafting, revision of right breast scar. I discussed the potential risks, benefits and alternatives, including operative and recovery time of the procedure.  Risks discussed included, but were not limited to: bleeding, infection, scarring, hematoma, poor wound healing, damage to underlying structures, need for further surgery, need for multiple procedures, skin flap necrosis, implant infection, capsular contracture, the risk of seroma, the risk of DVT, risk of asymmetry,  the need for revision, poor aesthetic result, need for removal of the implant. The patient noted her understanding.  All the patient's questions were answered to her satisfaction.  At this time, she desires to proceed.  Informed consent obtained.  Pre and postop instructions discussed.  Pre/post op instructions discussed.  She remains an appropriate candidate for surgery.  Surgical risk factors include: Obesity, history of previous failed tissue expander.  She is always welcome to call or return with any additional questions or concerns.        Keyshawn Arthur is a 47 y.o. female who presents for pre-op evaluation for  breast reconstruction.  Patient has history of right breast cancer, diagnosed in June of 2022. Pathologic stage from 6/24/2022: Stage IIA (pT2, pN0(sn), cM0, G3, ER+, OR-, HER2-).  Patient was also found to be BRCA1 mutation positive.  She underwent bilateral mastectomies.  She initially  had tissue expander reconstruction at [facility].  The left side tissue expander became infected and required removal.  Patient did complete the reconstruction on the right side with placement of the permanent implant.  She is  s/p Left Breast Recon And Placement Of Tissue Expander/adm - Left  on 12/27/2023 .[place] CPX 350cc tissue expander in left breast. No saline injected intraoperatively. She is now filled to 425cc.   She presents today to discuss her next stage of reconstruction.  Patient has underlying bipolar disorder, which is well controlled.  She did develop a cardiomyopathy from her initial dose of chemotherapy.  She is on Coreg and stable.  She denies any chest pain, shortness of breath, weakness, dizziness.  She states she is in her usual state of health.  No significant weight fluctuations.        Medical History           Past Medical History:   Diagnosis Date   • Bipolar 1 disorder (HCC)     • Breast cancer (HCC)       right   • Cardiomyopathy (HCC)     • Hypertension     • Hypothyroid                   Current Outpatient Medications:   •  Cariprazine HCl (Vraylar) 1.5 & 3 MG CPPK, Take 3 mg by mouth in the morning, Disp: , Rfl:   •  carvedilol (COREG) 6.25 mg tablet, Take 6.25 mg by mouth 2 (two) times a day with meals, Disp: , Rfl:   •  clomiPRAMINE (ANAFRANIL) 25 mg capsule, Take 75 mg by mouth every evening, Disp: , Rfl:   •  clonazePAM (KlonoPIN) 0.5 mg tablet, 2 (two) times a day as needed, Disp: , Rfl:   •  lamoTRIgine ER (LaMICtal XR) 100 MG TB24, 2 (two) times a day, Disp: , Rfl:   •  levothyroxine 100 mcg tablet, Take 100 mcg by mouth daily, Disp: , Rfl:   •  meloxicam (MOBIC) 15 mg tablet, , Disp: , Rfl:   •  olaparib (Lynparza) tablet, Take 300 mg by mouth 2 (two) times a day, Disp: , Rfl:   •  olmesartan (BENICAR) 20 mg tablet, Take 40 mg by mouth daily at bedtime, Disp: , Rfl:   •  spironolactone (ALDACTONE) 25 mg tablet, Take 12.5 mg by mouth daily At lunch, Disp: , Rfl:   •  tamoxifen  (NOLVADEX) 20 mg tablet, Take 20 mg by mouth daily, Disp: , Rfl:   •  cyclobenzaprine (FLEXERIL) 10 mg tablet, Take 1 tablet (10 mg total) by mouth 3 (three) times a day for 10 days, Disp: 30 tablet, Rfl: 0  •  enoxaparin (Lovenox) 40 mg/0.4 mL, Inject 0.4 mL (40 mg total) under the skin in the morning for 5 days, Disp: 2 mL, Rfl: 0  •  gabapentin (Neurontin) 300 mg capsule, Take 1 capsule (300 mg total) by mouth 3 (three) times a day as needed (nerve pain), Disp: 30 capsule, Rfl: 0  •  Levothyroxine Sodium 100 MCG/5ML SOLN, Use, Disp: , Rfl:   •  naltrexone (REVIA) 50 mg tablet, Take 50 mg by mouth daily as needed, Disp: , Rfl:   •  ondansetron (ZOFRAN) 4 mg tablet, Take 1 tablet (4 mg total) by mouth every 8 (eight) hours as needed for nausea or vomiting, Disp: 20 tablet, Rfl: 0  •  oxyCODONE (Roxicodone) 5 immediate release tablet, Take 1 tablet (5 mg total) by mouth every 4 (four) hours as needed for moderate pain for up to 15 doses Max Daily Amount: 30 mg, Disp: 15 tablet, Rfl: 0                   Allergies   Allergen Reactions   • Amoxicillin-Pot Clavulanate Diarrhea         Family History   Family History   Family history unknown: Yes      Adopted.        Social History                   Socioeconomic History   • Marital status: Single       Spouse name: Not on file   • Number of children: Not on file   • Years of education: Not on file   • Highest education level: Not on file   Occupational History   • Not on file   Tobacco Use   • Smoking status: Never   • Smokeless tobacco: Never   Substance and Sexual Activity   • Alcohol use: Never   • Drug use: Never   • Sexual activity: Not on file   Other Topics Concern   • Not on file   Social History Narrative   • Not on file      Social Determinants of Health      Financial Resource Strain: Not on file   Food Insecurity: Not on file   Transportation Needs: Not on file   Physical Activity: Not on file   Stress: Not on file   Social Connections: Not on file  "  Intimate Partner Violence: Not on file   Housing Stability: Not on file            Review of Systems  Pertinent items are noted in HPI.      Objective     /91   Pulse 74   Temp 97.8 °F (36.6 °C)   Ht 5' 4\" (1.626 m)   Wt 80.7 kg (178 lb)   LMP 05/01/2022 (Approximate)   BMI 30.55 kg/m²           General:  alert and oriented, in no acute distress   Skin:  normal and no rash or abnormalities   Eyes: conjunctivae/corneas clear. PERRL, EOM's intact. Fundi benign.   Mouth: MMM no lesions   Lymph Nodes:  Cervical, supraclavicular, and axillary nodes normal.   Lungs:  clear to auscultation bilaterally   Heart:  regular rate and rhythm, S1, S2 normal, no murmur, click, rub or gallop   Abdomen: soft, non-tender; bowel sounds normal; no masses,  no organomegaly   CVA:  absent   Genitourinary: defer exam   Extremities:  extremities normal, warm and well-perfused; no cyanosis, clubbing, or edema   Neurologic:  Alert and oriented x3. Gait normal. Reflexes and motor strength normal and symmetric. Cranial nerves 2-12 and sensation grossly intact.   Psychiatric:  normal mood, behavior, speech, dress, and thought processes   Right breast: Implant in place, soft, mobile.   No masses palpable, no axillary adenopathy palpable. +Excess skin over medial aspect creating a pointy shape.  Left breast: TE in place, good expansion obtained,  well-healed transverse mastectomy scar.  No masses palpable, no axillary adenopathy palpable.        A total of 45 mins was spent on the encounter, including reviewing the patient's records, documentation, and time spent in counseling coordination of care which represent greater than 50% of the visit. 30 mins was spent in counseling and discussion of surgical procedures.  "

## 2024-05-06 ENCOUNTER — TELEPHONE (OUTPATIENT)
Age: 49
End: 2024-05-06

## 2024-05-06 NOTE — PRE-PROCEDURE INSTRUCTIONS
Pre-Surgery Instructions:   Medication Instructions    Cariprazine HCl (Vraylar) 1.5 & 3 MG CPPK Take day of surgery.    carvedilol (COREG) 6.25 mg tablet Take day of surgery.    clomiPRAMINE (ANAFRANIL) 25 mg capsule Take day of surgery.    clonazePAM (KlonoPIN) 0.5 mg tablet Take day of surgery.    lamoTRIgine ER (LaMICtal XR) 100 MG TB24 Take day of surgery.    levothyroxine 100 mcg tablet Take day of surgery.    meloxicam (MOBIC) 15 mg tablet Stop taking 4 days prior to surgery.    olaparib (Lynparza) tablet Take day of surgery.    olmesartan (BENICAR) 20 mg tablet Take night before surgery    spironolactone (ALDACTONE) 25 mg tablet Hold day of surgery.    tamoxifen (NOLVADEX) 20 mg tablet Take day of surgery.    Medication instructions for day surgery reviewed. Please use only a sip of water to take your instructed medications. Avoid all over the counter vitamins, supplements and NSAIDS for one week prior to surgery per anesthesia guidelines. Tylenol is ok to take as needed.     You will receive a call one business day prior to surgery with an arrival time and hospital directions. If your surgery is scheduled on a Monday, the hospital will be calling you on the Friday prior to your surgery. If you have not heard from anyone by 8pm, please call the hospital supervisor through the hospital  at [phone]. ([employer] [phone] or [employer] [phone]).    Do not eat or drink anything after midnight the night before your surgery, including candy, mints, lifesavers, or chewing gum. Do not drink alcohol 24hrs before your surgery. Try not to smoke at least 24hrs before your surgery.       Follow the pre surgery showering instructions as listed in the “My Surgical Experience Booklet” or otherwise provided by your surgeon's office. Do not use a blade to shave the surgical area 1 week before surgery. It is okay to use a clean electric clippers up to 24 hours before surgery. Do not apply any lotions, creams,  including makeup, cologne, deodorant, or perfumes after showering on the day of your surgery. Do not use dry shampoo, hair spray, hair gel, or any type of hair products.     No contact lenses, eye make-up, or artificial eyelashes. Remove nail polish, including gel polish, and any artificial, gel, or acrylic nails if possible. Remove all jewelry including rings and body piercing jewelry.     Wear causal clothing that is easy to take on and off. Consider your type of surgery.    Keep any valuables, jewelry, piercings at home. Please bring any specially ordered equipment (sling, braces) if indicated.    Arrange for a responsible person to drive you to and from the hospital on the day of your surgery. Please confirm the visitor policy for the day of your procedure when you receive your phone call with an arrival time.     Call the surgeon's office with any new illnesses, exposures, or additional questions prior to surgery.    Please reference your “My Surgical Experience Booklet” for additional information to prepare for your upcoming surgery.

## 2024-05-06 NOTE — TELEPHONE ENCOUNTER
Fay called to make sure her FMLA forms were sent to her employer and to find out what restriction dates were on the form. Advised pt they had been sent and dates on the form that were an estimate of the time she will need off.

## 2024-05-07 ENCOUNTER — TELEPHONE (OUTPATIENT)
Dept: PLASTIC SURGERY | Facility: CLINIC | Age: 49
End: 2024-05-07

## 2024-05-07 NOTE — TELEPHONE ENCOUNTER
Dr. Givens would like the patient to hold her med's tamoxifen and olaparib (Lynparza) tablet for chemo. 7 days prior to surgery.   I did call the patient to let her know.

## 2024-05-13 ENCOUNTER — TELEPHONE (OUTPATIENT)
Age: 49
End: 2024-05-13

## 2024-05-13 NOTE — TELEPHONE ENCOUNTER
Patient called she is scheduled for left breast implant surgery on friday and she was wondering how much Tylenol 325mg  she could take before the surgery for her knee pain. Please advise

## 2024-05-13 NOTE — DISCHARGE INSTR - AVS FIRST PAGE
Post-Op Discharge Instructions  Dr. Fay Givens  [facility]astic and [facility]  [address] [place] [address], [facility] [address], [place] [address]  [phone]    You may bathe in 48 hours.    Your dressings should stay in place until seen in the office. Please do not get dressings wet or submerge them in water until seen in the office  Keep BAILEY dressings on until seen by Dr. Givens  If the light is blinking green, the battery pack is functioning properly  If the light blinks orange, hit the orange button to re-establish the seal, this will usually correct the problem, if the light continues to blink orange, the dressing will still continue to function  Call the office if the dressing becomes completely saturated    Keep surgical bra on at all times, except to shower.  It is ok to remove bra while laying down at rest for short intervals for comfort  After 2 weeks, it is ok to switch to a sports bra.  It is recommended to use one that bautista in the front.  No underwire bra for 6 weeks.  Use ABD pads or a form of padding for extra compression.    No strenuous activity or lifting over 10 lbs for a minimum of 4 weeks. No repetitive pushing, pulling or overhead arm motions.    Please record fluid from both of your drains daily and bring this to your first post-op appointment. After emptying the drain, be sure to squeeze the bulb as you reseal the cap.    Please lay on your back, with your head at an incline.     Your first post-op appointment is scheduled for 5/23/2024 @ 11:30AM at the [facility].    Take following medications with a checkmark ([x]) as prescribed, and do not take any pain medication on an empty stomach.  [x]Tylenol 1000mg, every 6 hours for pain control.  [x]Gabapentin 100mg, every 8 hours for nerve pain.  [x]Flexeril 10mg, every 8 hours for muscle spasms/pain.  [x]Oxycodone, 5mg, 1 tablet every 4 hours, as needed for severe/break-through  pain.  [x]Keflex, 500mg, 1 tablet every 8 hours.  (Antibiotic)  [x]Lovenox, 40mg injection, once a day.  Please do not take any vitamins, minerals, ibuprofen, hormonal drugs or immunologic drugs for 7 days post-op.    Resume any prior medications at home unless otherwise instructed by Dr. Givens    You must be off all pain medications and have a clear field of vision before driving.    For the next 24 hours:  a. Do not sign any legal documents or operate machinery.  b. Have a responsible adult help you.  c. Take it easy & rest.    Call Dr. Givens at the office [phone] if any:   a. Obvious bleeding, excessive swelling, or warmth at the site  b. Fever over 101.0°  c. Shortness of breath, severe calf or thigh pain.  d. Redness, odor, or pus at the wound. (Some oozing is normal from incision sites and may continue for several days)  e. Persistent vomiting or pain that is not relieved by your medication.

## 2024-05-14 ENCOUNTER — TELEPHONE (OUTPATIENT)
Age: 49
End: 2024-05-14

## 2024-05-14 NOTE — TELEPHONE ENCOUNTER
Rec'd call from patient requesting to get message to Dr. Givens prior to her upcoming surgery for this Friday.    She states that she and Dr. Hong' had discussed (if needed) harvesting fat and tissue from abdomen for breast during surgery.    Patient states that she DOES NOT want to do that any long. She states that she doesn't want to be dealing with two incisions healing at the same time.    Just an FYI.

## 2024-05-17 ENCOUNTER — HOSPITAL ENCOUNTER (OUTPATIENT)
Facility: HOSPITAL | Age: 49
Setting detail: OUTPATIENT SURGERY
Discharge: HOME | End: 2024-05-17
Attending: PLASTIC SURGERY | Admitting: PLASTIC SURGERY
Payer: COMMERCIAL

## 2024-05-17 ENCOUNTER — ANESTHESIA (OUTPATIENT)
Dept: PERIOP | Facility: HOSPITAL | Age: 49
End: 2024-05-17
Payer: COMMERCIAL

## 2024-05-17 ENCOUNTER — ANESTHESIA EVENT (OUTPATIENT)
Dept: PERIOP | Facility: HOSPITAL | Age: 49
End: 2024-05-17
Payer: COMMERCIAL

## 2024-05-17 VITALS
OXYGEN SATURATION: 96 % | RESPIRATION RATE: 18 BRPM | WEIGHT: 178 LBS | HEART RATE: 83 BPM | TEMPERATURE: 97 F | BODY MASS INDEX: 30.39 KG/M2 | HEIGHT: 64 IN | SYSTOLIC BLOOD PRESSURE: 147 MMHG | DIASTOLIC BLOOD PRESSURE: 84 MMHG

## 2024-05-17 DIAGNOSIS — Z15.09 BRCA1 POSITIVE: ICD-10-CM

## 2024-05-17 DIAGNOSIS — Z15.01 BRCA1 POSITIVE: ICD-10-CM

## 2024-05-17 DIAGNOSIS — Z85.3 PERSONAL HISTORY OF BREAST CANCER: ICD-10-CM

## 2024-05-17 DIAGNOSIS — Z98.890 STATUS POST LEFT BREAST RECONSTRUCTION: Primary | ICD-10-CM

## 2024-05-17 DIAGNOSIS — Z98.890 S/P BREAST RECONSTRUCTION, LEFT: ICD-10-CM

## 2024-05-17 PROCEDURE — C9290 INJ, BUPIVACAINE LIPOSOME: HCPCS

## 2024-05-17 PROCEDURE — 13102 CMPLX RPR TRUNK ADDL 5CM/<: CPT | Performed by: PHYSICIAN ASSISTANT

## 2024-05-17 PROCEDURE — 13102 CMPLX RPR TRUNK ADDL 5CM/<: CPT | Performed by: PLASTIC SURGERY

## 2024-05-17 PROCEDURE — NC001 PR NO CHARGE: Performed by: PLASTIC SURGERY

## 2024-05-17 PROCEDURE — 88302 TISSUE EXAM BY PATHOLOGIST: CPT | Performed by: PATHOLOGY

## 2024-05-17 PROCEDURE — 11970 RPLCMT TISS XPNDR PERM IMPLT: CPT | Performed by: PLASTIC SURGERY

## 2024-05-17 PROCEDURE — 19370 REVJ PERI-IMPLT CAPSULE BRST: CPT | Performed by: PLASTIC SURGERY

## 2024-05-17 PROCEDURE — 19370 REVJ PERI-IMPLT CAPSULE BRST: CPT | Performed by: PHYSICIAN ASSISTANT

## 2024-05-17 PROCEDURE — 13101 CMPLX RPR TRUNK 2.6-7.5 CM: CPT | Performed by: PHYSICIAN ASSISTANT

## 2024-05-17 PROCEDURE — 13101 CMPLX RPR TRUNK 2.6-7.5 CM: CPT | Performed by: PLASTIC SURGERY

## 2024-05-17 PROCEDURE — 11406 EXC TR-EXT B9+MARG >4.0 CM: CPT | Performed by: PHYSICIAN ASSISTANT

## 2024-05-17 PROCEDURE — 11970 RPLCMT TISS XPNDR PERM IMPLT: CPT | Performed by: PHYSICIAN ASSISTANT

## 2024-05-17 PROCEDURE — 11406 EXC TR-EXT B9+MARG >4.0 CM: CPT | Performed by: PLASTIC SURGERY

## 2024-05-17 PROCEDURE — L8600 IMPLANT BREAST SILICONE/EQ: HCPCS | Performed by: PLASTIC SURGERY

## 2024-05-17 DEVICE — NATRELLE INSPIRA SCM 600CC MODERATE PROFILE  SMOOTH ROUND SILICONE
Type: IMPLANTABLE DEVICE | Site: BREAST | Status: FUNCTIONAL
Brand: NATRELLE INSPIRA COHESIVE BREAST IMPLANTS

## 2024-05-17 RX ORDER — ONDANSETRON 2 MG/ML
INJECTION INTRAMUSCULAR; INTRAVENOUS AS NEEDED
Status: DISCONTINUED | OUTPATIENT
Start: 2024-05-17 | End: 2024-05-17

## 2024-05-17 RX ORDER — FENTANYL CITRATE/PF 50 MCG/ML
25 SYRINGE (ML) INJECTION
Status: DISCONTINUED | OUTPATIENT
Start: 2024-05-17 | End: 2024-05-17 | Stop reason: HOSPADM

## 2024-05-17 RX ORDER — GABAPENTIN 100 MG/1
100 CAPSULE ORAL ONCE
Status: COMPLETED | OUTPATIENT
Start: 2024-05-17 | End: 2024-05-17

## 2024-05-17 RX ORDER — PROPOFOL 10 MG/ML
INJECTION, EMULSION INTRAVENOUS AS NEEDED
Status: DISCONTINUED | OUTPATIENT
Start: 2024-05-17 | End: 2024-05-17

## 2024-05-17 RX ORDER — ROCURONIUM BROMIDE 10 MG/ML
INJECTION, SOLUTION INTRAVENOUS AS NEEDED
Status: DISCONTINUED | OUTPATIENT
Start: 2024-05-17 | End: 2024-05-17

## 2024-05-17 RX ORDER — MIDAZOLAM HYDROCHLORIDE 2 MG/2ML
INJECTION, SOLUTION INTRAMUSCULAR; INTRAVENOUS AS NEEDED
Status: DISCONTINUED | OUTPATIENT
Start: 2024-05-17 | End: 2024-05-17

## 2024-05-17 RX ORDER — LIDOCAINE HYDROCHLORIDE 10 MG/ML
INJECTION, SOLUTION EPIDURAL; INFILTRATION; INTRACAUDAL; PERINEURAL AS NEEDED
Status: DISCONTINUED | OUTPATIENT
Start: 2024-05-17 | End: 2024-05-17 | Stop reason: HOSPADM

## 2024-05-17 RX ORDER — DEXAMETHASONE SODIUM PHOSPHATE 10 MG/ML
INJECTION, SOLUTION INTRAMUSCULAR; INTRAVENOUS AS NEEDED
Status: DISCONTINUED | OUTPATIENT
Start: 2024-05-17 | End: 2024-05-17

## 2024-05-17 RX ORDER — ACETAMINOPHEN 325 MG/1
975 TABLET ORAL ONCE
Status: COMPLETED | OUTPATIENT
Start: 2024-05-17 | End: 2024-05-17

## 2024-05-17 RX ORDER — LIDOCAINE HYDROCHLORIDE 10 MG/ML
INJECTION, SOLUTION EPIDURAL; INFILTRATION; INTRACAUDAL; PERINEURAL AS NEEDED
Status: DISCONTINUED | OUTPATIENT
Start: 2024-05-17 | End: 2024-05-17

## 2024-05-17 RX ORDER — ENOXAPARIN SODIUM 100 MG/ML
40 INJECTION SUBCUTANEOUS DAILY
Qty: 2.8 ML | Refills: 0 | Status: SHIPPED | OUTPATIENT
Start: 2024-05-17 | End: 2024-05-24

## 2024-05-17 RX ORDER — SODIUM CHLORIDE, SODIUM LACTATE, POTASSIUM CHLORIDE, CALCIUM CHLORIDE 600; 310; 30; 20 MG/100ML; MG/100ML; MG/100ML; MG/100ML
75 INJECTION, SOLUTION INTRAVENOUS CONTINUOUS
Status: DISCONTINUED | OUTPATIENT
Start: 2024-05-17 | End: 2024-05-17 | Stop reason: HOSPADM

## 2024-05-17 RX ORDER — ONDANSETRON 2 MG/ML
4 INJECTION INTRAMUSCULAR; INTRAVENOUS ONCE AS NEEDED
Status: DISCONTINUED | OUTPATIENT
Start: 2024-05-17 | End: 2024-05-17 | Stop reason: HOSPADM

## 2024-05-17 RX ORDER — CEFAZOLIN SODIUM 2 G/50ML
SOLUTION INTRAVENOUS AS NEEDED
Status: DISCONTINUED | OUTPATIENT
Start: 2024-05-17 | End: 2024-05-17

## 2024-05-17 RX ORDER — LIDOCAINE HYDROCHLORIDE 10 MG/ML
0.5 INJECTION, SOLUTION EPIDURAL; INFILTRATION; INTRACAUDAL; PERINEURAL ONCE AS NEEDED
Status: DISCONTINUED | OUTPATIENT
Start: 2024-05-17 | End: 2024-05-17 | Stop reason: HOSPADM

## 2024-05-17 RX ORDER — PROPOFOL 10 MG/ML
INJECTION, EMULSION INTRAVENOUS CONTINUOUS PRN
Status: DISCONTINUED | OUTPATIENT
Start: 2024-05-17 | End: 2024-05-17

## 2024-05-17 RX ORDER — CEFAZOLIN SODIUM 1 G/50ML
1000 SOLUTION INTRAVENOUS ONCE
Status: DISCONTINUED | OUTPATIENT
Start: 2024-05-17 | End: 2024-05-17 | Stop reason: HOSPADM

## 2024-05-17 RX ORDER — MAGNESIUM HYDROXIDE 1200 MG/15ML
LIQUID ORAL AS NEEDED
Status: DISCONTINUED | OUTPATIENT
Start: 2024-05-17 | End: 2024-05-17 | Stop reason: HOSPADM

## 2024-05-17 RX ORDER — FENTANYL CITRATE 50 UG/ML
INJECTION, SOLUTION INTRAMUSCULAR; INTRAVENOUS AS NEEDED
Status: DISCONTINUED | OUTPATIENT
Start: 2024-05-17 | End: 2024-05-17

## 2024-05-17 RX ORDER — SODIUM CHLORIDE, SODIUM LACTATE, POTASSIUM CHLORIDE, CALCIUM CHLORIDE 600; 310; 30; 20 MG/100ML; MG/100ML; MG/100ML; MG/100ML
INJECTION, SOLUTION INTRAVENOUS CONTINUOUS PRN
Status: DISCONTINUED | OUTPATIENT
Start: 2024-05-17 | End: 2024-05-17

## 2024-05-17 RX ADMIN — SUGAMMADEX 200 MG: 100 INJECTION, SOLUTION INTRAVENOUS at 12:42

## 2024-05-17 RX ADMIN — PROPOFOL 100 MCG/KG/MIN: 10 INJECTION, EMULSION INTRAVENOUS at 11:18

## 2024-05-17 RX ADMIN — ROCURONIUM BROMIDE 50 MG: 10 INJECTION INTRAVENOUS at 10:45

## 2024-05-17 RX ADMIN — ACETAMINOPHEN 975 MG: 325 TABLET ORAL at 09:39

## 2024-05-17 RX ADMIN — LIDOCAINE HYDROCHLORIDE 50 MG: 10 INJECTION, SOLUTION EPIDURAL; INFILTRATION; INTRACAUDAL; PERINEURAL at 10:45

## 2024-05-17 RX ADMIN — CEFAZOLIN SODIUM 2000 MG: 2 SOLUTION INTRAVENOUS at 10:58

## 2024-05-17 RX ADMIN — ONDANSETRON 4 MG: 2 INJECTION INTRAMUSCULAR; INTRAVENOUS at 12:34

## 2024-05-17 RX ADMIN — ROCURONIUM BROMIDE 20 MG: 10 INJECTION INTRAVENOUS at 12:11

## 2024-05-17 RX ADMIN — GABAPENTIN 100 MG: 100 CAPSULE ORAL at 09:39

## 2024-05-17 RX ADMIN — DEXAMETHASONE SODIUM PHOSPHATE 10 MG: 10 INJECTION, SOLUTION INTRAMUSCULAR; INTRAVENOUS at 12:12

## 2024-05-17 RX ADMIN — SODIUM CHLORIDE, SODIUM LACTATE, POTASSIUM CHLORIDE, AND CALCIUM CHLORIDE: .6; .31; .03; .02 INJECTION, SOLUTION INTRAVENOUS at 10:40

## 2024-05-17 RX ADMIN — MIDAZOLAM HYDROCHLORIDE 2 MG: 1 INJECTION, SOLUTION INTRAMUSCULAR; INTRAVENOUS at 10:40

## 2024-05-17 RX ADMIN — FENTANYL CITRATE 50 MCG: 50 INJECTION, SOLUTION INTRAMUSCULAR; INTRAVENOUS at 10:45

## 2024-05-17 RX ADMIN — PROPOFOL 200 MG: 10 INJECTION, EMULSION INTRAVENOUS at 10:45

## 2024-05-17 RX ADMIN — FENTANYL CITRATE 50 MCG: 50 INJECTION, SOLUTION INTRAMUSCULAR; INTRAVENOUS at 12:32

## 2024-05-17 NOTE — ANESTHESIA POSTPROCEDURE EVALUATION
Post-Op Assessment Note    CV Status:  Stable    Pain management: adequate       Mental Status:  Alert and awake   Hydration Status:  Euvolemic   PONV Controlled:  Controlled   Airway Patency:  Patent     Post Op Vitals Reviewed: Yes    No anethesia notable event occurred.    Staff: COREY               /85 (05/17/24 1252)    Temp (!) 97.4 °F (36.3 °C) (05/17/24 1252)    Pulse 87 (05/17/24 1252)   Resp 16 (05/17/24 1252)    SpO2 98 % (05/17/24 1252)

## 2024-05-17 NOTE — ANESTHESIA PREPROCEDURE EVALUATION
Procedure:  REMOVAL OF LEFT TISSUE EXPANDER AND PLACEMENT OF PERMANENT IMPLANT. (Left: Breast)  REVISION OF RT BREAST RECONSTRUCTION WITH SCAR REVISION (Right: Breast)    Relevant Problems   CARDIO   (+) Hypertension      ENDO   (+) Hypothyroid      GYN   (+) Breast cancer (HCC)      NEURO/PSYCH   (+) Anxiety   (+) Depression        Physical Exam    Airway    Mallampati score: II  TM Distance: <3 FB  Neck ROM: full     Dental   No notable dental hx     Cardiovascular  Cardiovascular exam normal    Pulmonary  Pulmonary exam normal     Other Findings  post-pubertal.      Anesthesia Plan  ASA Score- 2     Anesthesia Type- general with ASA Monitors.         Additional Monitors:     Airway Plan: ETT.           Plan Factors-Exercise tolerance (METS): >4 METS.    Chart reviewed.   Existing labs reviewed.     Patient is not a current smoker. Patient not instructed to abstain from smoking on day of procedure. Patient did not smoke on day of surgery.            Induction- intravenous.    Postoperative Plan-         Informed Consent- Anesthetic plan and risks discussed with patient.  I personally reviewed this patient with the CRNA. Discussed and agreed on the Anesthesia Plan with the CRNA..

## 2024-05-17 NOTE — OP NOTE
OPERATIVE REPORT  PATIENT NAME: Fay Arthur    :  1975  MRN: [id number]  Pt Location:  OR ROOM 08    SURGERY DATE: 2024    Surgeons and Role:     * Fay Givens MD - Primary     * Laverne Silveira PA-C - Assisting    Preop Diagnosis:  S/P breast reconstruction, left [Z98.890]  BRCA1 positive [Z15.01, Z15.09]  Personal history of breast cancer [Z85.3]    Post-Op Diagnosis Codes:     * S/P breast reconstruction, left [Z98.890]     * BRCA1 positive [Z15.01, Z15.09]     * Personal history of breast cancer [Z85.3]    Procedure(s):  Left - REMOVAL OF LEFT TISSUE EXPANDER AND PLACEMENT OF PERMANENT IMPLANT.  Right - REVISION OF RT BREAST RECONSTRUCTION WITH SCAR REVISION    Specimen(s):  ID Type Source Tests Collected by Time Destination   1 : left breast skin Tissue Breast, Left TISSUE EXAM Fay Givens MD 2024 1230    2 :  Tissue Breast, Right TISSUE EXAM Fay Givens MD 2024 1135    3 : scar, right breast Tissue Breast, Right TISSUE EXAM Fay Givens MD 2024 1216        Estimated Blood Loss:   Minimal    Drains:  Closed/Suction Drain Inferior;Left;Lateral Chest Bulb 15 Fr. (Active)   Number of days: 142       Closed/Suction Drain Lateral;Left;Inferior Chest Bulb 15 Fr. (Active)   Number of days: 142       Anesthesia Type:   General    Operative Indications:  S/P breast reconstruction, left [Z98.890]  BRCA1 positive [Z15.01, Z15.09]  Personal history of breast cancer [Z85.3]      Operative Findings:  Jasvir [address] Cohesive gel implant, ref: [id number], SN: [id number], vol 600cc.      Complications:   None    Procedure and Technique:  Removal of left breast tissue expander, open capsulotomy, placement of permanent silicone gel implant, 600cc.  Scar revision of right breast with complex closure, 8.0cm  Left pectoralis block with Exparel.    Patient was identified in preoperative holding area and preoperative markings are made.  Patient was then transferred operating  room and placed supine on the operating table.  Venodyne boots were placed and activated.  All pressure points were appropriately and extensively padded.  After induction of general endotracheal anesthesia, entire anterior chest wall  and upper abdomen were sterilely prepped and draped this ChloraPrep in the usual fashion.   Attention then focused on the left breast.  The transverse mastectomy scar was then incised with a 15 blade scalpel taken down through skin subcutaneous tissue using electrocautery.  The capsule and pectoralis major muscle were then opened using electrocautery, revealing the tissue expander.  The tissue expander was then punctured with a 15 blade scalpel and the fluid was aspirated to allow for ease of removal.  The tissue expander was then removed EN bloc and discarded.  The pocket was examined.  The pocket was tight superiorly and a superior capsulotomy with undermining of the pectoralis major muscle was then performed to expand the capsule.  Next the inframammary fold was slightly higher than the right side.  Therefore, and inferior capsulotomy was then performed to expand the lower pole and lower the IMF slightly.   Next, multiple silicone gel breast sizers were then placed into the pocket to assess for the best volume and shape.  A good match appeared to be a 600 cc moderate-profile implant.  The patient was then placed into the seated position to further assess for symmetry.  Very good symmetry was obtained.  Patient was then placed back into the supine position.  A left-sided pectoralis 1 and 2 block was then performed with Exparel, 20 cc.  The left breast pocket was then copiously irrigated with double antibiotic solution containing Ancef and gentamicin.  A Betadine soak was also performed.  It was once again irrigated with double antibiotic solution.  Then the left chest wall was then sterilely re-prepped with ChloraPrep and redraped with sterile blue towels.  I then changed my gloves  to handle the implant.  Then an  [employer] Connelly Inspira Cohesive gel implant, ref: [id number], SN: [id number], vol 600cc was then placed Into the left breast pocket.  The capsule and pectoralis muscle was then closed with 2-0 Vicryl sutures in figure-of-eight fashion.   The skin was then closed in multiple layers 1st using 3-0 Monocryl  in a deep dermal fashion followed by 4-0 Monocryl Stratafix in a running subcuticular closure.  The incision site was cleansed and dried.  It was wiped with alcohol and then Exofin skin glue was applied.    Attention then focused on the right breast where there was excess skin present along the medial aspect of the scar.  The excess was then marked out in elliptical fashion measuring 8 x 3 cm in size. The area was then infiltrated with local anesthesia using 1% lidocaine, 7 cc.  Incision was then made with a 15 blade scalpel along the previous marked out ellipse and taken down through the skin and subcutaneous tissue.  The skin was sharply excised and sent to pathology as a specimen labeled right breast skin.  The skin edges were then widely undermined for 1 to 2 cm to allow for tension-free closure.  The wound was copiously irrigated with double antibiotic solution.  The skin flaps were then advanced and closed in layered fashion first using 3-0 Monocryl in a buried deep dermal fashion followed by 4-0 Monocryl STRATAFIX in a running subcuticular closure.  Total length of complex wound closure was 8.0 cm in size  The bilateral breast incisions were wiped with alcohol and Exofin skin glue was applied.   ABD pads and a surgical bra were then placed.   The patient tolerated the procedure well.  There were no complications.  She was awakened from anesthesia and transferred to recovery room awake and in stable condition.  There was no qualified resident available for the case. A physician assistant was required during the procedure for retraction tissue handling,dissection and suturing.   I  was present for the entire procedure.    Patient Disposition:  PACU         SIGNATURE: Fay Givens MD  DATE: May 17, 2024  TIME: 12:59 PM

## 2024-05-17 NOTE — H&P
H&P reviewed. After examining the patient I find no changes in the patients condition since the H&P had been written.    Vitals:    05/17/24 0935   BP: 131/91   Pulse: 77   Resp: 18   Temp: 97.8 °F (36.6 °C)   SpO2: 98%

## 2024-05-17 NOTE — NURSING NOTE
Pt able to tolerate po intake. Pt in no distress; declines pain medication at this time. AVS reviewed; pt verbalized understanding. IV removed.

## 2024-05-23 ENCOUNTER — OFFICE VISIT (OUTPATIENT)
Dept: PLASTIC SURGERY | Facility: CLINIC | Age: 49
End: 2024-05-23

## 2024-05-23 ENCOUNTER — TELEPHONE (OUTPATIENT)
Age: 49
End: 2024-05-23

## 2024-05-23 DIAGNOSIS — Z98.890 S/P BREAST RECONSTRUCTION, LEFT: Primary | ICD-10-CM

## 2024-05-23 DIAGNOSIS — Z15.01 BRCA1 POSITIVE: ICD-10-CM

## 2024-05-23 DIAGNOSIS — Z15.09 BRCA1 POSITIVE: ICD-10-CM

## 2024-05-23 DIAGNOSIS — Z85.3 HISTORY OF RIGHT BREAST CANCER: ICD-10-CM

## 2024-05-23 DIAGNOSIS — Z90.13 S/P MASTECTOMY, BILATERAL: ICD-10-CM

## 2024-05-23 PROCEDURE — 88302 TISSUE EXAM BY PATHOLOGIST: CPT | Performed by: PATHOLOGY

## 2024-05-23 PROCEDURE — 99024 POSTOP FOLLOW-UP VISIT: CPT

## 2024-05-23 NOTE — LETTER
May 23, 2024     Patient: Fay Arthur   YOB: 1975   Date of Visit: 5/23/2024       To Whom it May Concern:    Fay Arthur was seen in my clinic on 5/23/2024. She is able to return to work on 6/12/2024 without restrictions.     If you have any questions or concerns, please don't hesitate to call.         Sincerely,                Esmer Lema PA-C        CC: No Recipients

## 2024-05-23 NOTE — TELEPHONE ENCOUNTER
Called and lvm to confirm procedure was medically necessary. Left my teams number and office number if a vm is not valid as verbal confirmation.

## 2024-05-23 NOTE — TELEPHONE ENCOUNTER
[employer]  asking if the procedure that was done on 5/17 was medically necessary.     They recived paperwork but only has the name of the procedure not if it is medically necessary.     Please contact  EXT [phone]  [place]   They just need a verbal confirmation

## 2024-05-23 NOTE — PROGRESS NOTES
[facility] Plastic and Reconstructive Surgery  [address], [address], [place] [address]  [phone]    Patient Identification: Fay Arthur is a 48 y.o. female     History of Present Illness: The patient is a 48 y.o.  year-old female  who presents to the office for post-op visit. Patient is 6 days s/p Removal Of Left Tissue Expander And Placement Of Permanent Implant. - Left and Revision Of Rt Breast Reconstruction With Scar Revision - Right  on 5/17/2024 by Dr. Givens  Patient is doing well at this time. Denies fevers, chills, signs of infection or significant pain. Following activity restrictions and wearing bra at all times. Sleeping on back at an incline. Pt is happy with her results thus far.  Patient has no complaints at this time.    Past Medical History:   Diagnosis Date    Anxiety 12/27/2023    Arthritis     OA of bilateral knees    Bipolar 1 disorder (HCC) 12/27/2023    Breast cancer (HCC) 12/27/2023    right    Cardiomyopathy (HCC)     Depression 12/27/2023    Disease of thyroid gland     Hypertension 12/27/2023    Hypothyroid 12/27/2023          Review of Systems  Constitutional: Denies fevers, chills or pain.  Skin: Denies any warmth, erythema, edema or mucopurulent drainage.     Physical Exam    Breast: Surgical incisions are clean, dry, and intact. Glue in place. Skin perfusion is intact. Expected amount of post-operative edema and bruising. There are no signs of infection, obvious hematoma, seroma or wound dehiscence.  Implant soft, mobile.    Assessment and Plan:  The patient is an 48 y.o.  year-old female who presents to the office for post-op visit. Patient is 6 days s/p Removal Of Left Tissue Expander And Placement Of Permanent Implant. - Left and Revision Of Rt Breast Reconstruction With Scar Revision - Right  on 5/17/2024 by Dr. Givnes    Diagnosis related to today's visit include:  S/p breast reconstruction, left  S/p bilateral mastectomy  BRCA1 positive  History of right breast  cancer    -At today's visit bilateral breasts examined, healing well. Suture ends removed. Pt very happy with results.  -Continue wearing surgical compression bra at all times. Patient is to refrain from exercise/repetitive arm movements for 4-6 weeks post-op. Sleep on back at an incline. No submerging in water (pools, baths, hottubs, etc.) until 8 weeks post-op.  -May apply bacitracin or Aquaphor daily.  -The patient is to return in 3-4 weeks for breast exam. Call with any questions or concerns.  -The patient is to call the office with any questions or concerns. All of the patient's questions were answered at this time and they agree with the plan of care.      Esmer Lema PA-C  [facility] Plastic and Reconstructive Surgery

## 2024-05-25 NOTE — PROGRESS NOTES
Subjective:       Fay Arthur presents to the clinic POD#6 S/P placement of left breast tissue expander with ADM. Patient reports doing well. Only mild discomfort the first 2-3 days. Denies fever/chills. No other complaints. BAILEY dressing removed.      Objective:      LMP 05/01/2022 (Approximate)     General:  alert and oriented, in no acute distress       Left breast Incision:   healing well, no drainage, no erythema, no seroma, , no dehiscence, incision well approximated, Drains with SSOP.        Assessment:      Doing well postoperatively. Posterior drain removed.      Plan:      1. Continue any current medications, continue keflex  2. Wound care discussed- may begin to shower, apply Neosporin to drain site and incision site, continue compression bra.  3. Continue post op activity restrictions. Recommend no driving until drains are out.  4. Follow up: 1 week for drain removal and wound check.    Orthopedic

## 2024-06-25 ENCOUNTER — OFFICE VISIT (OUTPATIENT)
Dept: PLASTIC SURGERY | Facility: CLINIC | Age: 49
End: 2024-06-25

## 2024-06-25 DIAGNOSIS — Z98.890 S/P BREAST RECONSTRUCTION, LEFT: Primary | ICD-10-CM

## 2024-06-25 DIAGNOSIS — Z15.01 BRCA1 POSITIVE: ICD-10-CM

## 2024-06-25 DIAGNOSIS — Z15.09 BRCA1 POSITIVE: ICD-10-CM

## 2024-06-25 DIAGNOSIS — Z90.13 S/P MASTECTOMY, BILATERAL: ICD-10-CM

## 2024-06-25 DIAGNOSIS — Z85.3 HISTORY OF RIGHT BREAST CANCER: ICD-10-CM

## 2024-06-25 PROCEDURE — 99024 POSTOP FOLLOW-UP VISIT: CPT

## 2024-06-25 NOTE — PROGRESS NOTES
[facility]astic and Reconstructive Surgery  [address], [address], [place] [address]  [phone]    Patient Identification: Fay Arthur is a 48 y.o. female     History of Present Illness: The patient is a 48 y.o.  year-old female  who presents to the office for post-op visit. Patient is 39 days s/p Removal Of Left Tissue Expander And Placement Of Permanent Implant. - Left and Revision Of Rt Breast Reconstruction With Scar Revision - Right  on 5/17/2024 by Dr. Chon Connelly Inspira Cohesive gel implant, 600cc left breast  Patient is doing well at this time. Denies fevers, chills, signs of infection or significant pain. Following activity restrictions and wearing bra at all times. Sleeping on back at an incline. Pt is happy with her results thus far.  Patient has no complaints at this time.    Past Medical History:   Diagnosis Date    Anxiety 12/27/2023    Arthritis     OA of bilateral knees    Bipolar 1 disorder (HCC) 12/27/2023    Breast cancer (HCC) 12/27/2023    right    Cardiomyopathy ([facility])     Depression 12/27/2023    Disease of thyroid gland     Hypertension 12/27/2023    Hypothyroid 12/27/2023        Review of Systems  Constitutional: Denies fevers, chills or pain.  Skin: Denies any warmth, erythema, edema or mucopurulent drainage.     Physical Exam    Breast: Surgical incision healing well. Skin perfusion is intact. Expected amount of post-operative edema present. There are no signs of infection, obvious hematoma, seroma or wound dehiscence.  Implant soft, mobile. Good size and symmetry.    Assessment and Plan:  The patient is an 48 y.o.  year-old female who presents to the office for post-op visit. Patient is 39 days s/p Removal Of Left Tissue Expander And Placement Of Permanent Implant. - Left and Revision Of Rt Breast Reconstruction With Scar Revision - Right  on 5/17/2024 by Dr. Givens    Diagnosis related to today's visit include:  S/p breast reconstruction, left  S/p  bilateral mastectomy  BRCA1 positive  History of right breast cancer    -At today's visit bilateral breasts examined, healing well. Pt very happy with results.  -Continue wearing surgical compression bra during the daytime. Patient may begin using arms in daily activities. Suggested returning to arm exercises with moderation. No submerging in water (pools, baths, hottubs, etc.) until 8 weeks post-op.  -May apply Aquaphor daily.  -Discussed daily scar massage and use of silicone scar gel/tape to promote scar softening and flattening. The patient was educated on scar care and that it can take up to 1 year for full scar maturation.  -The patient is to return in 4-6 weeks for 3 month follow up. Call with any questions or concerns.  -The patient is to call the office with any questions or concerns. All of the patient's questions were answered at this time and they agree with the plan of care.      Esmer Lema PA-C  [facility] Plastic and Reconstructive Surgery

## 2024-09-17 ENCOUNTER — OFFICE VISIT (OUTPATIENT)
Dept: PLASTIC SURGERY | Facility: CLINIC | Age: 49
End: 2024-09-17
Payer: COMMERCIAL

## 2024-09-17 DIAGNOSIS — Z98.890 S/P BREAST RECONSTRUCTION, LEFT: Primary | ICD-10-CM

## 2024-09-17 DIAGNOSIS — N65.1 BREAST ASYMMETRY FOLLOWING RECONSTRUCTIVE SURGERY: ICD-10-CM

## 2024-09-17 DIAGNOSIS — Z15.09 BRCA1 POSITIVE: ICD-10-CM

## 2024-09-17 DIAGNOSIS — Z90.13 S/P MASTECTOMY, BILATERAL: ICD-10-CM

## 2024-09-17 DIAGNOSIS — Z15.01 BRCA1 POSITIVE: ICD-10-CM

## 2024-09-17 DIAGNOSIS — Z85.3 HISTORY OF RIGHT BREAST CANCER: ICD-10-CM

## 2024-09-17 PROCEDURE — 99213 OFFICE O/P EST LOW 20 MIN: CPT | Performed by: PLASTIC SURGERY

## 2024-09-17 NOTE — PROGRESS NOTES
[facility]astic and Reconstruct[facility]  [address], [address], [place] [address]  [phone]     Patient Identification: Fay Arthur is a 48 y.o. female      History of Present Illness: The patient is a 48 y.o.  year-old female  who presents to the office for post-op visit. Patient is 39 days s/p Removal Of Left Tissue Expander And Placement Of Permanent Implant. - Left and Revision Of Rt Breast Reconstruction With Scar Revision - Right  on 5/17/2024 with Jasvir Connelly Inspira Cohesive gel implant, 600cc left breast  Patient is doing well at this time. Denies fevers, chills, signs of infection or significant pain. Following activity restrictions and wearing bra at all times.  Pt is happy with her results thus far. She does note the right breast has more projection and feels firmer than the left side.        Medical History        Past Medical History:   Diagnosis Date    Anxiety 12/27/2023    Arthritis       OA of bilateral knees    Bipolar 1 disorder (HCC) 12/27/2023    Breast cancer (HCC) 12/27/2023     right    Cardiomyopathy (HCC)      Depression 12/27/2023    Disease of thyroid gland      Hypertension 12/27/2023    Hypothyroid 12/27/2023            Review of Systems  Constitutional: Denies fevers, chills or pain.  Skin: Denies any warmth, erythema, edema or mucopurulent drainage.      Physical Exam  Gen: AAOx3, NAD  Right breast: implant in place, narrower with more projection, implant soft, and mobile, but does feel firmer than left side.   Right breast: Surgical incision healing well. Skin perfusion is intact. There are no signs of infection, obvious hematoma, seroma or wound dehiscence.  Implant soft, mobile. Wider with less projection than right side.      Assessment and Plan:  The patient is an 48 y.o.  year-old female who presents to the office for post-op visit. Patient is 39 days s/p Removal Of Left Tissue Expander And Placement Of Permanent Implant. - Left and Revision Of  Rt Breast Reconstruction With Scar Revision - Right  on 5/17/2024         -At today's visit bilateral breasts examined, healing well. Pt  happy with results.  -Continue wearing surgical compression bra during the daytime. May increase activity level as tolerated.  -May apply Aquaphor daily.  -Discussed daily scar massage and use of silicone scar gel/tape to promote scar softening and flattening. The patient was educated on scar care and that it can take up to 1 year for full scar maturation.  -Patient wishes to return on a prn basis.   -The patient is to call the office with any questions or concerns. All of the patient's questions were answered at this time and they agree with the plan of care.

## 2025-02-20 ENCOUNTER — TELEPHONE (OUTPATIENT)
Age: 50
End: 2025-02-20

## 2025-02-20 NOTE — TELEPHONE ENCOUNTER
Received call from patient asking if we can contact [facility] in [place] or [facility].    She would like for us to contact them to confirm for them that she had breast surgery with us and that she is still taking Tamoxifen.    Patient stated that they need this information from us in order to continue to provide services for her.    Patient stated that she does not have there number but we can find it on goggle.

## (undated) DEVICE — BASIC SINGLE BASIN-LF: Brand: MEDLINE INDUSTRIES, INC.

## (undated) DEVICE — NEEDLE 25G X 1 1/2

## (undated) DEVICE — PLASMABLADE PS200-040 4.0: Brand: PLASMABLADE™

## (undated) DEVICE — SYRINGE 10ML LL

## (undated) DEVICE — TUBING SUCTION 5MM X 12 FT

## (undated) DEVICE — PACK UNIVERSAL DRAPES SUB-Q ICD

## (undated) DEVICE — Device

## (undated) DEVICE — SUT SILK 2-0 SH 30 IN K833H

## (undated) DEVICE — SCD SEQUENTIAL COMPRESSION COMFORT SLEEVE MEDIUM KNEE LENGTH: Brand: KENDALL SCD

## (undated) DEVICE — PLASMABLADE X PS210-030S-LIGHT 3.0SL: Brand: PLASMABLADE™ X

## (undated) DEVICE — PLUMEPEN PRO 10FT

## (undated) DEVICE — DISPOSABLE OR TOWEL: Brand: CARDINAL HEALTH

## (undated) DEVICE — SUT STRATAFIX SPIRAL 2-0 CT-1 30 CM SXPP1B410

## (undated) DEVICE — ELECTRODE BLADE MOD E-Z CLEAN  2.75IN 7CM -0012AM

## (undated) DEVICE — 1820 FOAM BLOCK NEEDLE COUNTER: Brand: DEVON

## (undated) DEVICE — PROXIMATE SKIN STAPLERS (35 WIDE) CONTAINS 35 STAINLESS STEEL STAPLES (FIXED HEAD): Brand: PROXIMATE

## (undated) DEVICE — GLOVE SRG BIOGEL 6.5

## (undated) DEVICE — JP CHAN DRN SIL HUBLESS 15FR W/TRO: Brand: CARDINAL HEALTH

## (undated) DEVICE — CHEST/BREAST DRAPE: Brand: CONVERTORS

## (undated) DEVICE — ARGYLE YANKAUER BULB TIP, NO VENT WITH TUBING 1/4” X 6’ (6 MM X 1.8 M): Brand: ARGYLE

## (undated) DEVICE — EVACUATOR BULB 100CC SILICONE

## (undated) DEVICE — PICO 7 SINGLE 10X20CM: Brand: PICO™ 7

## (undated) DEVICE — FUNNEL E KELLER 2 DELIVERY DEVICE

## (undated) DEVICE — BETHLEHEM UNIVERSAL MINOR GEN: Brand: CARDINAL HEALTH

## (undated) DEVICE — BRA SURGICAL SZ MED (33-36)

## (undated) DEVICE — SINGLE PORT MANIFOLD: Brand: NEPTUNE 2

## (undated) DEVICE — LIGHT GLOVE GREEN

## (undated) DEVICE — SUT PDS II 2-0 CT-1 27 IN Z339H

## (undated) DEVICE — SKIN MARKER DUAL TIP WITH RULER CAP, FLEXIBLE RULER AND LABELS: Brand: DEVON

## (undated) DEVICE — DECANTER: Brand: UNBRANDED

## (undated) DEVICE — VIOLET BRAIDED (POLYGLACTIN 910), SYNTHETIC ABSORBABLE SUTURE: Brand: COATED VICRYL

## (undated) DEVICE — SUT MONOCRYL 5-0 P-3 18 IN Y493G

## (undated) DEVICE — GAUZE SPONGES,16 PLY: Brand: CURITY

## (undated) DEVICE — DRESSING BIOPATCH ANTIMICROBIAL 1 IN DISC

## (undated) DEVICE — SUT MONOCRYL 3-0 SH 27 IN Y416H

## (undated) DEVICE — TELFA NON-ADHERENT ABSORBENT DRESSING: Brand: TELFA

## (undated) DEVICE — SUT VICRYL 2-0 CT-1 36 IN J945H

## (undated) DEVICE — SYRINGE 30ML LL

## (undated) DEVICE — ADHESIVE SKIN HIGH VISCOSITY EXOFIN 1ML

## (undated) DEVICE — SUT PDS II 2-0 CT-2 27 IN Z333H

## (undated) DEVICE — BULB SYRINGE,IRRIGATION WITH PROTECTIVE CAP: Brand: DOVER

## (undated) DEVICE — JACKSON-PRATT 100CC BULB RESERVOIR: Brand: CARDINAL HEALTH

## (undated) DEVICE — SUT STRATAFIX SPIRAL MONOCRYL PLUS 4-0 PS-2 45CM SXMP1B118

## (undated) DEVICE — INTENDED FOR TISSUE SEPARATION, AND OTHER PROCEDURES THAT REQUIRE A SHARP SURGICAL BLADE TO PUNCTURE OR CUT.: Brand: BARD-PARKER ® SAFETYLOCK CARBON RIB-BACK BLADES

## (undated) DEVICE — INTENDED FOR TISSUE SEPARATION, AND OTHER PROCEDURES THAT REQUIRE A SHARP SURGICAL BLADE TO PUNCTURE OR CUT.: Brand: BARD-PARKER ® CARBON RIB-BACK BLADES

## (undated) DEVICE — SPONGE LAP 18 X 18 IN STRL RFD

## (undated) DEVICE — DRAPE SHEET THREE QUARTER

## (undated) DEVICE — STANDARD SURGICAL GOWN, L: Brand: CONVERTORS

## (undated) DEVICE — POV-IOD SOLUTION 4OZ BT

## (undated) DEVICE — GAUZE SPONGES,8 PLY: Brand: CURITY

## (undated) DEVICE — CHLORAPREP HI-LITE 26ML ORANGE

## (undated) DEVICE — ALL PURPOSE SPONGES,NON-WOVEN, 3 PLY: Brand: CURITY

## (undated) DEVICE — NEEDLE BLUNT 18 G X 1 1/2IN

## (undated) DEVICE — 3M™ TEGADERM™ CHG DRESSING 25/CARTON 4 CARTONS/CASE 1659: Brand: TEGADERM™

## (undated) DEVICE — SUPER SPONGES,MEDIUM: Brand: KERLIX

## (undated) DEVICE — SUT STRATAFIX SPIRAL PLUS 3-0 PS-2 30 X 30 CM SXMP2B408

## (undated) DEVICE — SUT VICRYL 2-0 SH 27 IN UNDYED J417H

## (undated) DEVICE — STERILE POLYISOPRENE POWDER-FREE SURGICAL GLOVES: Brand: PROTEXIS